# Patient Record
Sex: MALE | HISPANIC OR LATINO | Employment: FULL TIME | URBAN - METROPOLITAN AREA
[De-identification: names, ages, dates, MRNs, and addresses within clinical notes are randomized per-mention and may not be internally consistent; named-entity substitution may affect disease eponyms.]

---

## 2020-07-31 PROBLEM — U07.1 COVID-19 VIRUS INFECTION: Status: ACTIVE | Noted: 2020-07-31

## 2020-08-05 PROBLEM — K35.30 ACUTE APPENDICITIS WITH LOCALIZED PERITONITIS, WITHOUT PERFORATION OR GANGRENE: Status: ACTIVE | Noted: 2020-08-05

## 2020-08-17 ENCOUNTER — HOSPITAL ENCOUNTER (OUTPATIENT)
Facility: MEDICAL CENTER | Age: 75
DRG: 682 | End: 2020-08-17

## 2020-08-17 RX ORDER — ACETAMINOPHEN 325 MG/1
650 TABLET ORAL EVERY 6 HOURS PRN
Status: CANCELLED | OUTPATIENT
Start: 2020-08-17

## 2020-08-18 ENCOUNTER — APPOINTMENT (OUTPATIENT)
Dept: RADIOLOGY | Facility: MEDICAL CENTER | Age: 75
DRG: 682 | End: 2020-08-18
Attending: HOSPITALIST
Payer: OTHER GOVERNMENT

## 2020-08-18 ENCOUNTER — HOSPITAL ENCOUNTER (INPATIENT)
Facility: MEDICAL CENTER | Age: 75
LOS: 6 days | DRG: 682 | End: 2020-08-24
Attending: HOSPITALIST | Admitting: HOSPITALIST
Payer: OTHER GOVERNMENT

## 2020-08-18 PROBLEM — E87.5 HYPERKALEMIA: Status: ACTIVE | Noted: 2020-08-18

## 2020-08-18 PROBLEM — N17.9 ARF (ACUTE RENAL FAILURE) (HCC): Status: ACTIVE | Noted: 2020-08-18

## 2020-08-18 PROBLEM — N28.89 RENAL MASS: Status: ACTIVE | Noted: 2020-08-18

## 2020-08-18 LAB
ANION GAP SERPL CALC-SCNC: 21 MMOL/L (ref 7–16)
ANION GAP SERPL CALC-SCNC: 22 MMOL/L (ref 7–16)
ANION GAP SERPL CALC-SCNC: 23 MMOL/L (ref 7–16)
APPEARANCE UR: CLEAR
BILIRUB UR QL STRIP.AUTO: NEGATIVE
BUN SERPL-MCNC: 104 MG/DL (ref 8–22)
BUN SERPL-MCNC: 105 MG/DL (ref 8–22)
BUN SERPL-MCNC: 109 MG/DL (ref 8–22)
CALCIUM SERPL-MCNC: 8.1 MG/DL (ref 8.5–10.5)
CALCIUM SERPL-MCNC: 8.4 MG/DL (ref 8.5–10.5)
CALCIUM SERPL-MCNC: 8.8 MG/DL (ref 8.5–10.5)
CHLORIDE SERPL-SCNC: 102 MMOL/L (ref 96–112)
CHLORIDE SERPL-SCNC: 106 MMOL/L (ref 96–112)
CHLORIDE SERPL-SCNC: 106 MMOL/L (ref 96–112)
CO2 SERPL-SCNC: 12 MMOL/L (ref 20–33)
CO2 SERPL-SCNC: 14 MMOL/L (ref 20–33)
CO2 SERPL-SCNC: 15 MMOL/L (ref 20–33)
COLOR UR: YELLOW
COVID ORDER STATUS COVID19: NORMAL
CREAT SERPL-MCNC: 11.22 MG/DL (ref 0.5–1.4)
CREAT SERPL-MCNC: 11.32 MG/DL (ref 0.5–1.4)
CREAT SERPL-MCNC: 11.58 MG/DL (ref 0.5–1.4)
CREAT UR-MCNC: 77.15 MG/DL
EKG IMPRESSION: NORMAL
GLUCOSE BLD-MCNC: 89 MG/DL (ref 65–99)
GLUCOSE BLD-MCNC: 93 MG/DL (ref 65–99)
GLUCOSE SERPL-MCNC: 103 MG/DL (ref 65–99)
GLUCOSE SERPL-MCNC: 94 MG/DL (ref 65–99)
GLUCOSE SERPL-MCNC: 97 MG/DL (ref 65–99)
GLUCOSE UR STRIP.AUTO-MCNC: NEGATIVE MG/DL
KETONES UR STRIP.AUTO-MCNC: NEGATIVE MG/DL
LEUKOCYTE ESTERASE UR QL STRIP.AUTO: NEGATIVE
MICRO URNS: NORMAL
NITRITE UR QL STRIP.AUTO: NEGATIVE
PH UR STRIP.AUTO: 6 [PH] (ref 5–8)
POTASSIUM SERPL-SCNC: 5.3 MMOL/L (ref 3.6–5.5)
POTASSIUM SERPL-SCNC: 5.8 MMOL/L (ref 3.6–5.5)
POTASSIUM SERPL-SCNC: 6.2 MMOL/L (ref 3.6–5.5)
PROT UR QL STRIP: NEGATIVE MG/DL
RBC UR QL AUTO: NEGATIVE
SODIUM SERPL-SCNC: 139 MMOL/L (ref 135–145)
SODIUM SERPL-SCNC: 140 MMOL/L (ref 135–145)
SODIUM SERPL-SCNC: 142 MMOL/L (ref 135–145)
SODIUM UR-SCNC: 93 MMOL/L
SP GR UR STRIP.AUTO: 1.01
UROBILINOGEN UR STRIP.AUTO-MCNC: 0.2 MG/DL

## 2020-08-18 PROCEDURE — C9803 HOPD COVID-19 SPEC COLLECT: HCPCS | Performed by: HOSPITALIST

## 2020-08-18 PROCEDURE — 700102 HCHG RX REV CODE 250 W/ 637 OVERRIDE(OP): Performed by: INTERNAL MEDICINE

## 2020-08-18 PROCEDURE — 700111 HCHG RX REV CODE 636 W/ 250 OVERRIDE (IP): Performed by: HOSPITALIST

## 2020-08-18 PROCEDURE — 700105 HCHG RX REV CODE 258: Performed by: INTERNAL MEDICINE

## 2020-08-18 PROCEDURE — U0003 INFECTIOUS AGENT DETECTION BY NUCLEIC ACID (DNA OR RNA); SEVERE ACUTE RESPIRATORY SYNDROME CORONAVIRUS 2 (SARS-COV-2) (CORONAVIRUS DISEASE [COVID-19]), AMPLIFIED PROBE TECHNIQUE, MAKING USE OF HIGH THROUGHPUT TECHNOLOGIES AS DESCRIBED BY CMS-2020-01-R: HCPCS

## 2020-08-18 PROCEDURE — 700111 HCHG RX REV CODE 636 W/ 250 OVERRIDE (IP): Performed by: INTERNAL MEDICINE

## 2020-08-18 PROCEDURE — 80048 BASIC METABOLIC PNL TOTAL CA: CPT

## 2020-08-18 PROCEDURE — 82962 GLUCOSE BLOOD TEST: CPT

## 2020-08-18 PROCEDURE — 93005 ELECTROCARDIOGRAM TRACING: CPT | Performed by: HOSPITALIST

## 2020-08-18 PROCEDURE — 700101 HCHG RX REV CODE 250: Performed by: INTERNAL MEDICINE

## 2020-08-18 PROCEDURE — 93010 ELECTROCARDIOGRAM REPORT: CPT | Performed by: INTERNAL MEDICINE

## 2020-08-18 PROCEDURE — 770020 HCHG ROOM/CARE - TELE (206)

## 2020-08-18 PROCEDURE — 94640 AIRWAY INHALATION TREATMENT: CPT

## 2020-08-18 PROCEDURE — 51798 US URINE CAPACITY MEASURE: CPT

## 2020-08-18 PROCEDURE — 99223 1ST HOSP IP/OBS HIGH 75: CPT | Performed by: INTERNAL MEDICINE

## 2020-08-18 PROCEDURE — 76775 US EXAM ABDO BACK WALL LIM: CPT

## 2020-08-18 PROCEDURE — 84300 ASSAY OF URINE SODIUM: CPT

## 2020-08-18 PROCEDURE — A9270 NON-COVERED ITEM OR SERVICE: HCPCS | Performed by: INTERNAL MEDICINE

## 2020-08-18 PROCEDURE — 81003 URINALYSIS AUTO W/O SCOPE: CPT

## 2020-08-18 PROCEDURE — 82570 ASSAY OF URINE CREATININE: CPT

## 2020-08-18 PROCEDURE — 36415 COLL VENOUS BLD VENIPUNCTURE: CPT

## 2020-08-18 RX ORDER — HEPARIN SODIUM 5000 [USP'U]/ML
5000 INJECTION, SOLUTION INTRAVENOUS; SUBCUTANEOUS EVERY 8 HOURS
Status: DISCONTINUED | OUTPATIENT
Start: 2020-08-18 | End: 2020-08-24 | Stop reason: HOSPADM

## 2020-08-18 RX ORDER — LACTULOSE 10 G/15ML
300 SOLUTION ORAL ONCE
Status: DISPENSED | OUTPATIENT
Start: 2020-08-18 | End: 2020-08-19

## 2020-08-18 RX ORDER — BISACODYL 10 MG
10 SUPPOSITORY, RECTAL RECTAL
Status: DISCONTINUED | OUTPATIENT
Start: 2020-08-18 | End: 2020-08-24 | Stop reason: HOSPADM

## 2020-08-18 RX ORDER — POLYETHYLENE GLYCOL 3350 17 G/17G
1 POWDER, FOR SOLUTION ORAL
Status: DISCONTINUED | OUTPATIENT
Start: 2020-08-18 | End: 2020-08-24 | Stop reason: HOSPADM

## 2020-08-18 RX ORDER — LABETALOL HYDROCHLORIDE 5 MG/ML
10 INJECTION, SOLUTION INTRAVENOUS EVERY 4 HOURS PRN
Status: DISCONTINUED | OUTPATIENT
Start: 2020-08-18 | End: 2020-08-24 | Stop reason: HOSPADM

## 2020-08-18 RX ORDER — CALCIUM GLUCONATE 94 MG/ML
1 INJECTION, SOLUTION INTRAVENOUS ONCE
Status: DISCONTINUED | OUTPATIENT
Start: 2020-08-18 | End: 2020-08-18

## 2020-08-18 RX ORDER — SODIUM POLYSTYRENE SULFONATE 15 G/60ML
15 SUSPENSION ORAL; RECTAL ONCE
Status: COMPLETED | OUTPATIENT
Start: 2020-08-18 | End: 2020-08-18

## 2020-08-18 RX ORDER — IPRATROPIUM BROMIDE AND ALBUTEROL SULFATE 2.5; .5 MG/3ML; MG/3ML
3 SOLUTION RESPIRATORY (INHALATION)
Status: DISCONTINUED | OUTPATIENT
Start: 2020-08-18 | End: 2020-08-24 | Stop reason: HOSPADM

## 2020-08-18 RX ORDER — AMOXICILLIN 250 MG
2 CAPSULE ORAL 2 TIMES DAILY
Status: DISCONTINUED | OUTPATIENT
Start: 2020-08-18 | End: 2020-08-24 | Stop reason: HOSPADM

## 2020-08-18 RX ORDER — SODIUM CHLORIDE 9 MG/ML
INJECTION, SOLUTION INTRAVENOUS CONTINUOUS
Status: DISCONTINUED | OUTPATIENT
Start: 2020-08-18 | End: 2020-08-18

## 2020-08-18 RX ORDER — SODIUM BICARBONATE IN D5W 150/1000ML
PLASTIC BAG, INJECTION (ML) INTRAVENOUS CONTINUOUS
Status: DISCONTINUED | OUTPATIENT
Start: 2020-08-18 | End: 2020-08-19

## 2020-08-18 RX ORDER — DEXTROSE MONOHYDRATE 25 G/50ML
50 INJECTION, SOLUTION INTRAVENOUS ONCE
Status: COMPLETED | OUTPATIENT
Start: 2020-08-18 | End: 2020-08-18

## 2020-08-18 RX ORDER — ACETAMINOPHEN 325 MG/1
650 TABLET ORAL EVERY 6 HOURS PRN
Status: DISCONTINUED | OUTPATIENT
Start: 2020-08-18 | End: 2020-08-24 | Stop reason: HOSPADM

## 2020-08-18 RX ADMIN — HEPARIN SODIUM 5000 UNITS: 5000 INJECTION, SOLUTION INTRAVENOUS; SUBCUTANEOUS at 21:38

## 2020-08-18 RX ADMIN — INSULIN HUMAN 10 UNITS: 100 INJECTION, SOLUTION PARENTERAL at 03:50

## 2020-08-18 RX ADMIN — ALBUTEROL SULFATE 5 MG: 2.5 SOLUTION RESPIRATORY (INHALATION) at 04:27

## 2020-08-18 RX ADMIN — DEXTROSE MONOHYDRATE 50 ML: 25 INJECTION, SOLUTION INTRAVENOUS at 03:53

## 2020-08-18 RX ADMIN — SODIUM CHLORIDE: 9 INJECTION, SOLUTION INTRAVENOUS at 03:57

## 2020-08-18 RX ADMIN — CALCIUM GLUCONATE 1 G: 98 INJECTION, SOLUTION INTRAVENOUS at 12:04

## 2020-08-18 RX ADMIN — SODIUM BICARBONATE 50 MEQ: 84 INJECTION, SOLUTION INTRAVENOUS at 06:05

## 2020-08-18 RX ADMIN — PATIROMER 8.4 G: 8.4 POWDER, FOR SUSPENSION ORAL at 11:56

## 2020-08-18 RX ADMIN — ASPIRIN 81 MG: 81 TABLET, COATED ORAL at 04:03

## 2020-08-18 RX ADMIN — HEPARIN SODIUM 5000 UNITS: 5000 INJECTION, SOLUTION INTRAVENOUS; SUBCUTANEOUS at 04:03

## 2020-08-18 RX ADMIN — SODIUM BICARBONATE 125 ML/HR: 84 INJECTION, SOLUTION INTRAVENOUS at 10:20

## 2020-08-18 RX ADMIN — DOCUSATE SODIUM 50 MG AND SENNOSIDES 8.6 MG 2 TABLET: 8.6; 5 TABLET, FILM COATED ORAL at 04:03

## 2020-08-18 RX ADMIN — SODIUM BICARBONATE: 84 INJECTION, SOLUTION INTRAVENOUS at 20:19

## 2020-08-18 RX ADMIN — HEPARIN SODIUM 5000 UNITS: 5000 INJECTION, SOLUTION INTRAVENOUS; SUBCUTANEOUS at 16:20

## 2020-08-18 RX ADMIN — SODIUM POLYSTYRENE SULFONATE 15 G: 15 SUSPENSION ORAL; RECTAL at 06:18

## 2020-08-18 ASSESSMENT — COGNITIVE AND FUNCTIONAL STATUS - GENERAL
SUGGESTED CMS G CODE MODIFIER MOBILITY: CH
DAILY ACTIVITIY SCORE: 24
SUGGESTED CMS G CODE MODIFIER DAILY ACTIVITY: CH
MOBILITY SCORE: 24

## 2020-08-18 ASSESSMENT — PATIENT HEALTH QUESTIONNAIRE - PHQ9
2. FEELING DOWN, DEPRESSED, IRRITABLE, OR HOPELESS: NOT AT ALL
SUM OF ALL RESPONSES TO PHQ9 QUESTIONS 1 AND 2: 0
1. LITTLE INTEREST OR PLEASURE IN DOING THINGS: NOT AT ALL

## 2020-08-18 ASSESSMENT — ENCOUNTER SYMPTOMS
CONSTITUTIONAL NEGATIVE: 1
COUGH: 0
MYALGIAS: 0
NAUSEA: 0
BRUISES/BLEEDS EASILY: 0
SORE THROAT: 0
PALPITATIONS: 0
DIZZINESS: 0
NECK PAIN: 0
STRIDOR: 0
INSOMNIA: 0
FEVER: 0
HEMOPTYSIS: 0
DEPRESSION: 0
WEIGHT LOSS: 0
DOUBLE VISION: 0
BLURRED VISION: 0
VOMITING: 0
HEADACHES: 0

## 2020-08-18 ASSESSMENT — COPD QUESTIONNAIRES
DO YOU EVER COUGH UP ANY MUCUS OR PHLEGM?: NO/ONLY WITH OCCASIONAL COLDS OR INFECTIONS
COPD SCREENING SCORE: 2
DURING THE PAST 4 WEEKS HOW MUCH DID YOU FEEL SHORT OF BREATH: NONE/LITTLE OF THE TIME
IN THE PAST 12 MONTHS DO YOU DO LESS THAN YOU USED TO BECAUSE OF YOUR BREATHING PROBLEMS: DISAGREE/UNSURE
HAVE YOU SMOKED AT LEAST 100 CIGARETTES IN YOUR ENTIRE LIFE: NO/DON'T KNOW

## 2020-08-18 ASSESSMENT — LIFESTYLE VARIABLES
ALCOHOL_USE: NO
CONSUMPTION TOTAL: NEGATIVE
TOTAL SCORE: 0
EVER HAD A DRINK FIRST THING IN THE MORNING TO STEADY YOUR NERVES TO GET RID OF A HANGOVER: NO
EVER_SMOKED: YES
DOES PATIENT WANT TO STOP DRINKING: NO
TOTAL SCORE: 0
ON A TYPICAL DAY WHEN YOU DRINK ALCOHOL HOW MANY DRINKS DO YOU HAVE: 0
TOTAL SCORE: 0
HAVE YOU EVER FELT YOU SHOULD CUT DOWN ON YOUR DRINKING: NO
HOW MANY TIMES IN THE PAST YEAR HAVE YOU HAD 5 OR MORE DRINKS IN A DAY: 0
AVERAGE NUMBER OF DAYS PER WEEK YOU HAVE A DRINK CONTAINING ALCOHOL: 0
HAVE PEOPLE ANNOYED YOU BY CRITICIZING YOUR DRINKING: NO
EVER FELT BAD OR GUILTY ABOUT YOUR DRINKING: NO

## 2020-08-18 ASSESSMENT — FIBROSIS 4 INDEX: FIB4 SCORE: 1.17

## 2020-08-18 NOTE — PROGRESS NOTES
2 RN skin check completed with ERENDIRA Pagan  Devices in place N/A.  Confirmed pressure ulcers found on N/A.  New potential pressure ulcers noted on bilateral ears. Wound consult placed 8/18/20.  The following interventions in place keeping skin clean and dry, patient does not require oxygen at this time.

## 2020-08-18 NOTE — PROGRESS NOTES
Patient arrived on unit approximately 0100 in care of EMS.  Admitting and hospitalist paged.  Admission profile completed with .  Patient is Azerbaijani speaking ONLY.  A&O x4, up with standby assist and steady.

## 2020-08-18 NOTE — ASSESSMENT & PLAN NOTE
Renal mass noted on images from CT at [facility] July 29; usg renal showed renal mass   -I called urology Dr. Heredia on 8/20/2020 with stated that he will follow the patient as an outpatient.  He also said that he will make an appointment for him

## 2020-08-18 NOTE — H&P
Hospital Medicine History & Physical Note    Date of Service  8/18/2020    Primary Care Physician  Pcp Pt States None    Consultants  Interventional radiology pending    Code Status  Full Code    Chief Complaint  No chief complaint on file.      History of Presenting Illness  75 y.o. male who presented 8/18/2020 with past medical history of hypertension treated with lisinopril and was well until July 29, 2020.  He was brought to [facility] for evaluation of abdominal pain and cough he was found to have COVID-19, creatinine of 1.8, suspected acute appendicitis and a renal mass.    Patient had been greatly improved in fact only complains of exception of extreme thirst but follow-up labs revealed a creatinine of 12, BUN of 112 and a creatinine of 6.0 without acute EKG changes.  Patient is conversational and seemingly well he does have mild left-sided flank pain.  He denies recent changes in his urine output or bowel habits.      Review of Systems  Review of Systems   Constitutional: Negative.  Negative for fever, malaise/fatigue and weight loss.   HENT: Negative for sore throat and tinnitus.    Eyes: Negative for blurred vision and double vision.   Respiratory: Negative for cough, hemoptysis and stridor.    Cardiovascular: Negative for chest pain and palpitations.   Gastrointestinal: Negative for nausea and vomiting.   Genitourinary: Negative for dysuria and urgency.   Musculoskeletal: Negative for myalgias and neck pain.   Skin: Negative for itching and rash.   Neurological: Negative for dizziness and headaches.   Endo/Heme/Allergies: Does not bruise/bleed easily.   Psychiatric/Behavioral: Negative for depression. The patient does not have insomnia.        Past Medical History   has a past medical history of Hypertension.    Surgical History   has no past surgical history on file.     Family History  family history includes Diabetes in his sister.     Social History   reports that he quit smoking about 10 years  ago. His smoking use included cigarettes. He has never used smokeless tobacco. He reports that he does not drink alcohol or use drugs.    Allergies  No Known Allergies    Medications  Prior to Admission Medications   Prescriptions Last Dose Informant Patient Reported? Taking?   aspirin (SB LOW DOSE ASA EC) 81 MG EC tablet 8/17/2020 at Unknown time Patient Yes No   Sig: Take 81 mg by mouth every day.   lisinopril (PRINIVIL, ZESTRIL) 40 MG tablet 8/17/2020 at Unknown time  No No   Sig: take 1/2 tablet by mouth once daily for blood pressure      Facility-Administered Medications: None       Physical Exam  Temp:  [36.6 °C (97.9 °F)] 36.6 °C (97.9 °F)  Pulse:  [74] 74  Resp:  [17] 17  BP: (153)/(61) 153/61  SpO2:  [93 %] 93 %    Physical Exam  Vitals signs and nursing note reviewed. Exam conducted with a chaperone present.   Constitutional:       Appearance: Normal appearance. He is normal weight.   HENT:      Head: Normocephalic and atraumatic.      Mouth/Throat:      Mouth: Mucous membranes are dry.   Eyes:      Extraocular Movements: Extraocular movements intact.      Conjunctiva/sclera: Conjunctivae normal.      Pupils: Pupils are equal, round, and reactive to light.   Neck:      Musculoskeletal: Normal range of motion and neck supple.   Cardiovascular:      Rate and Rhythm: Normal rate and regular rhythm.      Pulses: Normal pulses.      Heart sounds: Normal heart sounds.   Pulmonary:      Effort: Pulmonary effort is normal.      Breath sounds: Normal breath sounds.   Abdominal:      General: Abdomen is flat. Bowel sounds are normal.      Palpations: Abdomen is soft.   Musculoskeletal:         General: Tenderness present. No swelling, deformity or signs of injury.      Comments: Left leg tenderness   Skin:     General: Skin is warm and dry.      Capillary Refill: Capillary refill takes less than 2 seconds.   Neurological:      General: No focal deficit present.      Mental Status: He is alert and oriented to person,  place, and time. Mental status is at baseline.   Psychiatric:         Mood and Affect: Mood normal.         Behavior: Behavior normal.         Thought Content: Thought content normal.         Judgment: Judgment normal.         Laboratory:  Recent Labs     08/17/20 2005   WBC 11.3*   RBC 4.65*   HEMOGLOBIN 13.7*   HEMATOCRIT 42.6   MCV 91.6   MCH 29.5   MCHC 32.2*   RDW 13.5   PLATELETCT 449*   MPV 10.1     Recent Labs     08/17/20 2050   SODIUM 141   POTASSIUM 6.0*   CHLORIDE 110*   CO2 10*   GLUCOSE 108*   *   CREATININE 12.7*   CALCIUM 8.6*     Recent Labs     08/17/20 2050   ALTSGPT 17   ASTSGOT 29   ALKPHOSPHAT 178*   TBILIRUBIN 0.7   LIPASE 307*   GLUCOSE 108*         No results for input(s): NTPROBNP in the last 72 hours.      No results for input(s): TROPONINT in the last 72 hours.    Imaging:  No orders to display         Assessment/Plan:  I anticipate this patient will require at least two midnights for appropriate medical management, necessitating inpatient admission.    Renal mass  Assessment & Plan  Renal mass noted on images from CT at [facility] July 29.  Images requested, follow-up interventional radiology consult    Hyperkalemia  Assessment & Plan  Secondary to lisinopril c acute renal failure, hyperkalemia care set initiated.  Kayexalate p.o. lactulose VA, follow-up a.m. chemistry, consider repeat EKG as no T wave changes on initial tracing.      ARF (acute renal failure) (HCC)  Assessment & Plan  Acute on CKD 3, complicated by renal mass, Lisinopril, and severe prerenal azotemia.  IV fluid challenge, avoid nephrotoxic meds and doses, follow-up renal ultrasound and a.m. chemistry.  Obtain images from [facility].    COVID-19 virus infection- (present on admission)  Assessment & Plan  Diagnosed July 29.  Currently asymptomatic.

## 2020-08-18 NOTE — ASSESSMENT & PLAN NOTE
Acute on CKD 3, complicated by renal mass   --Likely secondary to ATN, nephrology following, monitor I/Os, avoid nephrotoxin, renal adjustment of medication  Patient to have low bicarb at 16 along with loose bowel movement, he was started on half NS with 75 mEq of bicarb   - creatinine improving at 7s , will monitor

## 2020-08-18 NOTE — RESPIRATORY CARE
5mg nebulizer treatment given at 0427.   Placed sign outside door for PAPR/CAPR be worn inside room until 0530.

## 2020-08-18 NOTE — PROGRESS NOTES
This 75-year-old male with a past medical history significant for hypertension on lisinopril was transferred from [facility] after he was noted to be in acute renal failure with a creatinine of 12.7.  Patient was recently diagnosed with COVID on July 29; at that time he is noted to have BUN of 43 and creatinine of 1.8.    Upon presentation he is found to have BUN of 111, creatinine of 12.7, potassium 6.0.  Urine sodium, urine creatinine ordered, retroperitoneal ultrasound obtained; no hydronephrosis noted, atrophic right kidney possible due to chronic renal disease, 1.6 cm right renal cortical mass.    Patient was started on bicarb drip, nephrology was consulted.  Will monitor strict I/O.  Will call urology tomorrow as patient was noted to have right renal mass.    Repeat BMP showed K of 5.3  And HCO3 of 15.  Avoid nephrotoxins, IV fluid, monitor potassium tomorrow a.m.

## 2020-08-18 NOTE — ASSESSMENT & PLAN NOTE
Diagnosed July 29.  Currently asymptomatic.   --- Infection complete, patient has recovered from infection

## 2020-08-18 NOTE — PROGRESS NOTES
75-year-old male transferring from [facility], where he was found to have an acute kidney injury with creatinine 12.7 and .  Was recently evaluated and diagnosed with coronavirus on 29 July, at which time he had a normal BMP with creatinine 1.8 and BUN of 43.  Currently has a potassium of 6 with no EKG changes.

## 2020-08-18 NOTE — CONSULTS
[facility] Nephrology Consult Note     Date of Service  8/18     Author: Kieran Perez Jr., MD    Reason for consult:  ARMANDO    HPI:  74 yo male with h/o HTN, who presented to [facility] with ARMANDO. He was found to be COVID positive. He reportedly was on lisinopril, but patient states he has not taken that for years. He denies NSAID use. He does report some diarrhea.     He denies change in urination. He reports no pain. He was found to have creatinine of 12, BUN of 112 and K of 6.0 so was transferred down to [facility].     Since admission, he reports making some urine. He was started on sodium bicarbonate gtt. He asks why he is getting his blood checked so often.     I d/w him possible need for dialysis. He seems sceptical that he needs it, but I d/w him if K keeps rising, his heart can stop.     Renal US shows atrophic right kidney as well as right renal mass. No obstruction of left kidney.      Past Medical History  Past Medical History:   Diagnosis Date   • Hypertension        Past Surgical History  History reviewed. No pertinent surgical history.    Social History:  Social History     Socioeconomic History   • Marital status:      Spouse name: Not on file   • Number of children: Not on file   • Years of education: Not on file   • Highest education level: Not on file   Occupational History   • Not on file   Social Needs   • Financial resource strain: Not on file   • Food insecurity     Worry: Not on file     Inability: Not on file   • Transportation needs     Medical: Not on file     Non-medical: Not on file   Tobacco Use   • Smoking status: Former Smoker     Types: Cigarettes     Quit date: 7/29/2010     Years since quitting: 10.0   • Smokeless tobacco: Never Used   • Tobacco comment: 1 siobhan daily   Substance and Sexual Activity   • Alcohol use: No   • Drug use: No   • Sexual activity: Not on file   Lifestyle   • Physical activity     Days per week: Not on file     Minutes per session:  Not on file   • Stress: Not on file   Relationships   • Social connections     Talks on phone: Not on file     Gets together: Not on file     Attends Hinduism service: Not on file     Active member of club or organization: Not on file     Attends meetings of clubs or organizations: Not on file     Relationship status: Not on file   • Intimate partner violence     Fear of current or ex partner: Not on file     Emotionally abused: Not on file     Physically abused: Not on file     Forced sexual activity: Not on file   Other Topics Concern   • Not on file   Social History Narrative   • Not on file       Review of Systems  GEN: no fevers/chills/weight loss  HEENT: No vision changes  RESP: No shortness of breath  CV: No edema, no chest pain  ABD: no N/V, no edema  EXT: no edema  Musculoskeletal: no joint pain  NEURO: no headaches, no weakness  PSYCH: no confusion, no depression      Medications  Administrations This Visit     albuterol (PROVENTIL) 2.5 mg/0.5 mL nebulizer solution 5 mg     Admin Date  08/18/2020 Action  Given Dose  5 mg Route  Nebulization Site            aspirin EC (ECOTRIN) tablet 81 mg     Admin Date  08/18/2020 Action  Given Dose  81 mg Route  Oral Site            dextrose 50% (D50W) injection 50 mL     Admin Date  08/18/2020 Action  New Bag Dose  50 mL Route  Intravenous Site            heparin injection 5,000 Units     Admin Date  08/18/2020 Action  Given Dose  5000 Units Route  Subcutaneous Site  Left Lower Quad Abdomen          insulin regular (HumuLIN R,NovoLIN R) injection     Admin Date  08/18/2020 Action  Given Dose  10 Units Route  Intravenous Site            NS infusion     Admin Date  08/18/2020 Action  New Bag Dose   Rate  200 mL/hr Route  Intravenous Site            senna-docusate (PERICOLACE or SENOKOT S) 8.6-50 MG per tablet 2 Tab     Admin Date  08/18/2020 Action  Given Dose  2 Tab Route  Oral Site            sodium bicarbonate 150 mEq in D5W infusion (premix)     Admin  Date  08/18/2020 Action  New Bag Dose  125 mL/hr Rate  125 mL/hr Route  Intravenous Site            sodium bicarbonate 8.4 % injection 50 mEq     Admin Date  08/18/2020 Action  Given Dose  50 mEq Route  Intravenous Site            sodium polystyrene (KAYEXALATE) 15 GM/60ML suspension 15 g     Admin Date  08/18/2020 Action  Given Dose  15 g Route  Oral Site                  Physical Exam  Vitals:    08/18/20 0801   BP: 134/64   Pulse: 79   Resp: 19   Temp: 36.1 °C (97 °F)   SpO2: 94%          Physical Exam  Constitutional:       Appearance: Normal appearance.   HENT:      Head: Normocephalic and atraumatic.      Nose: Nose normal.   Eyes:      Extraocular Movements: Extraocular movements intact.      Conjunctiva/sclera: Conjunctivae normal.      Pupils: Pupils are equal, round, and reactive to light.   Neck:      Musculoskeletal: Normal range of motion and neck supple.   Cardiovascular:      Rate and Rhythm: Normal rate and regular rhythm.      Pulses: Normal pulses.      Heart sounds: No murmur. No friction rub. No gallop.    Pulmonary:      Effort: Pulmonary effort is normal.      Breath sounds: Normal breath sounds.   Abdominal:      General: Abdomen is flat. Bowel sounds are normal.      Palpations: Abdomen is soft.   Musculoskeletal:         General: No edema  Skin:     General: Skin is warm and dry.   Neurological:      General: No focal deficit present.      Mental Status: He is alert and oriented to person, place, and time.        Fluids       Intake/Output Summary (Last 24 hours) at 8/18/2020 1109  Last data filed at 8/18/2020 1000  Gross per 24 hour   Intake 240 ml   Output --   Net 240 ml           Labs    Lab Results   Component Value Date/Time    SODIUM 140 08/18/2020 09:16 AM    POTASSIUM 6.2 (H) 08/18/2020 09:16 AM    CHLORIDE 106 08/18/2020 09:16 AM    CO2 12 (L) 08/18/2020 09:16 AM    GLUCOSE 94 08/18/2020 09:16 AM     (HH) 08/18/2020 09:16 AM    CREATININE 11.32 (HH) 08/18/2020 09:16 AM        Recent Labs     08/17/20 2005   WBC 11.3*   RBC 4.65*   HEMOGLOBIN 13.7*   HEMATOCRIT 42.6   MCV 91.6   MCH 29.5   RDW 13.5   PLATELETCT 449*   MPV 10.1       Radiology:  Renal US reviewed    Assessment  74 yo male with h/o HTN, CKD stage 3 with creatinine 1.8 on 7/29, who presents with creatinine of 12 in setting of COVID positive    1) ARMANDO - suspect mixture of pre-renal/ATN given diarrhea and COVID positive. Near needing dialysis. Will try to volume replete and see if his urination improves.   2) COVID Positive  3) Hyperkalemia - 2/2 to renal failure. If does not improve with medical management, will need dialysis  4) Possible CKD stage 3 - creaitnine 1.8 on 7/29, 2.5 weeks prior to admission  5) Diarrhea   6) Azotemia - without signs of uremia.   7) Renal mass - right atrophic kidney. Will need evaluation at later date.      PLAN:  1. Increase bicarbonate gtt to 150 ml/hr  2. Veltassa 8.4 grams PO x 1  3. Calcium gluconate 1 gram x 1  4. Continue with q6 hours BMP. If K does not improve, will need to start dialysis.   5. I have discussed above findings with hospitalist and RN personally.   6. Check urine tests and UA    Thank you for the consult, please call with any questions.    Kieran Perez Jr, MD  [facility] Nephrology

## 2020-08-18 NOTE — PROGRESS NOTES
Received ED to Direct Admit transfer request from [facility] ED, [place], [place].  Sending Physician: Tom  Specialist consulted: NA  Diagnosis: Acute Renal Failure; COVID19 positive (7/29) - no repeat testing; generalized weakness; decreased appetite; decreased oral intake.  BUN/Creat 111/12.7 (up from BUN/Creat 4.3/1.8); K 6.  Patient Accepted by: Konstantin

## 2020-08-19 PROBLEM — D64.9 NORMOCYTIC ANEMIA: Status: ACTIVE | Noted: 2020-08-19

## 2020-08-19 LAB
ALBUMIN SERPL BCP-MCNC: 2.6 G/DL (ref 3.2–4.9)
BASOPHILS # BLD AUTO: 0.5 % (ref 0–1.8)
BASOPHILS # BLD: 0.03 K/UL (ref 0–0.12)
BUN SERPL-MCNC: 97 MG/DL (ref 8–22)
CALCIUM SERPL-MCNC: 7.9 MG/DL (ref 8.5–10.5)
CHLORIDE SERPL-SCNC: 98 MMOL/L (ref 96–112)
CO2 SERPL-SCNC: 23 MMOL/L (ref 20–33)
CREAT SERPL-MCNC: 10.72 MG/DL (ref 0.5–1.4)
EOSINOPHIL # BLD AUTO: 0.11 K/UL (ref 0–0.51)
EOSINOPHIL NFR BLD: 1.9 % (ref 0–6.9)
ERYTHROCYTE [DISTWIDTH] IN BLOOD BY AUTOMATED COUNT: 43.8 FL (ref 35.9–50)
GLUCOSE SERPL-MCNC: 152 MG/DL (ref 65–99)
HCT VFR BLD AUTO: 31.4 % (ref 42–52)
HGB BLD-MCNC: 10.1 G/DL (ref 14–18)
IMM GRANULOCYTES # BLD AUTO: 0.03 K/UL (ref 0–0.11)
IMM GRANULOCYTES NFR BLD AUTO: 0.5 % (ref 0–0.9)
LYMPHOCYTES # BLD AUTO: 0.67 K/UL (ref 1–4.8)
LYMPHOCYTES NFR BLD: 11.4 % (ref 22–41)
MCH RBC QN AUTO: 29.6 PG (ref 27–33)
MCHC RBC AUTO-ENTMCNC: 32.2 G/DL (ref 33.7–35.3)
MCV RBC AUTO: 92.1 FL (ref 81.4–97.8)
MONOCYTES # BLD AUTO: 0.42 K/UL (ref 0–0.85)
MONOCYTES NFR BLD AUTO: 7.1 % (ref 0–13.4)
NEUTROPHILS # BLD AUTO: 4.62 K/UL (ref 1.82–7.42)
NEUTROPHILS NFR BLD: 78.6 % (ref 44–72)
NRBC # BLD AUTO: 0 K/UL
NRBC BLD-RTO: 0 /100 WBC
PHOSPHATE SERPL-MCNC: 8.1 MG/DL (ref 2.5–4.5)
PLATELET # BLD AUTO: 340 K/UL (ref 164–446)
PMV BLD AUTO: 10.3 FL (ref 9–12.9)
POTASSIUM SERPL-SCNC: 4.2 MMOL/L (ref 3.6–5.5)
RBC # BLD AUTO: 3.41 M/UL (ref 4.7–6.1)
SARS-COV-2 RNA RESP QL NAA+PROBE: DETECTED
SODIUM SERPL-SCNC: 140 MMOL/L (ref 135–145)
SPECIMEN SOURCE: ABNORMAL
WBC # BLD AUTO: 5.9 K/UL (ref 4.8–10.8)

## 2020-08-19 PROCEDURE — 700111 HCHG RX REV CODE 636 W/ 250 OVERRIDE (IP): Performed by: INTERNAL MEDICINE

## 2020-08-19 PROCEDURE — 700102 HCHG RX REV CODE 250 W/ 637 OVERRIDE(OP): Performed by: INTERNAL MEDICINE

## 2020-08-19 PROCEDURE — 85025 COMPLETE CBC W/AUTO DIFF WBC: CPT

## 2020-08-19 PROCEDURE — 700101 HCHG RX REV CODE 250: Performed by: INTERNAL MEDICINE

## 2020-08-19 PROCEDURE — A9270 NON-COVERED ITEM OR SERVICE: HCPCS | Performed by: INTERNAL MEDICINE

## 2020-08-19 PROCEDURE — 99233 SBSQ HOSP IP/OBS HIGH 50: CPT | Performed by: HOSPITALIST

## 2020-08-19 PROCEDURE — 36415 COLL VENOUS BLD VENIPUNCTURE: CPT

## 2020-08-19 PROCEDURE — 80069 RENAL FUNCTION PANEL: CPT

## 2020-08-19 PROCEDURE — 770020 HCHG ROOM/CARE - TELE (206)

## 2020-08-19 RX ADMIN — HEPARIN SODIUM 5000 UNITS: 5000 INJECTION, SOLUTION INTRAVENOUS; SUBCUTANEOUS at 05:08

## 2020-08-19 RX ADMIN — ASPIRIN 81 MG: 81 TABLET, COATED ORAL at 05:08

## 2020-08-19 RX ADMIN — HEPARIN SODIUM 5000 UNITS: 5000 INJECTION, SOLUTION INTRAVENOUS; SUBCUTANEOUS at 21:29

## 2020-08-19 RX ADMIN — HEPARIN SODIUM 5000 UNITS: 5000 INJECTION, SOLUTION INTRAVENOUS; SUBCUTANEOUS at 13:45

## 2020-08-19 RX ADMIN — SODIUM BICARBONATE: 84 INJECTION, SOLUTION INTRAVENOUS at 04:30

## 2020-08-19 ASSESSMENT — ENCOUNTER SYMPTOMS
SORE THROAT: 0
VOMITING: 0
ABDOMINAL PAIN: 0
DEPRESSION: 0
HEMOPTYSIS: 0
BLURRED VISION: 0
WEIGHT LOSS: 0
COUGH: 0
PHOTOPHOBIA: 0
FEVER: 0
ORTHOPNEA: 0
PALPITATIONS: 0
HEARTBURN: 0
NAUSEA: 0
MYALGIAS: 1
DOUBLE VISION: 0
NECK PAIN: 0
SHORTNESS OF BREATH: 1
CHILLS: 0

## 2020-08-19 NOTE — WOUND TEAM
[facility]  Inpatient Services  Initial Wound and Skin Care Evaluation    Admission Date: 8/18/2020     Last order of IP CONSULT TO WOUND CARE was found on 8/18/2020 from Hospital Encounter on 8/17/2020       HPI, PMH, SH: Reviewed    Unit where seen by Wound Team: S175/[id number]     WOUND CONSULT/FOLLOW UP RELATED TO:  Using  line was able to communicate with pt about wounds    Self Report / Pain Level:  Dolor with palpation      OBJECTIVE:  On pressure redistribution mattress, ears ELIECER, Pt was able to move self    WOUND TYPE, LOCATION, CHARACTERISTICS (Pressure Injuries: location, stage, POA or date identified)  Wound 08/18/20 Pressure Injury Ear Posterior Left POA DTI (Active)   Site Assessment Purple;Red    Periwound Assessment Intact    Margins Defined edges    Drainage Amount None    Dressing Options Open to Air    NEXT Weekly Photo (Inpatient Only) 08/19/20    Pressure Injury Stage DTPI    Wound Length (cm) 1 cm 08/18/20 1445   Wound Width (cm) 0.3 cm 08/18/20 1445   Wound Surface Area (cm^2) 0.3 cm^2 08/18/20 1445   Tunneling (cm) 0 cm 08/18/20 1445   Undermining (cm) 0 cm 08/18/20 1445   Shape linear    Wound Odor None    Exposed Structures None    Number of days: 0       Wound 08/18/20 Pressure Injury Ear Right POA DTI (Active)   Site Assessment Purple;Red    Periwound Assessment Intact    Margins Defined edges    Drainage Amount None    Dressing Options Open to Air    Dressing Status Open to Air    NEXT Weekly Photo (Inpatient Only) 08/19/20    Pressure Injury Stage DTPI    Wound Length (cm) 0.8 cm 08/18/20 1445   Wound Width (cm) 0.3 cm 08/18/20 1445   Wound Surface Area (cm^2) 0.24 cm^2 08/18/20 1445   Tunneling (cm) 0 cm 08/18/20 1445   Undermining (cm) 0 cm 08/18/20 1445   Shape linear    Wound Odor None    Exposed Structures None    Number of days: 0          Vascular:    CYNDY:   No results found.      Lab Values:    Lab Results   Component Value Date/Time    WBC 11.3 (H) 08/17/2020  "08:05 PM    RBC 4.65 (L) 08/17/2020 08:05 PM    HEMOGLOBIN 13.7 (L) 08/17/2020 08:05 PM    HEMATOCRIT 42.6 08/17/2020 08:05 PM          Culture:NA  Culture Results show:  No results found for this or any previous visit (from the past 720 hour(s)).      INTERVENTIONS BY WOUND TEAM:  Assessed both ears, left wounds ELIECER.  Interdisciplinary consultation: Patient, Bedside RN     EVALUATION: pt has DTI to proximal bilateral ears. Using  line was able to discover pt uses a face mask @ work and he wears it from [id number]. Per pt \"I only get to take off on my way home.\"  He was able to answer questions. Explained that he has wounds on ears \"like bedsores caused by his mask\". Pt requested note from  about reason he is in hospital. This was reported to primary RN. Wounds may open or resolve r/t no mask in place.       Goals: Steady decrease in wound area and depth weekly.    NURSING PLAN OF CARE ORDERS (X):    Dressing changes: See Dressing Care orders:   Skin care: See Skin Care orders: x  Rectal tube care: See Rectal Tube Care orders:   Other orders:    RSKIN:   CURRENTLY IN PLACE (X), APPLIED THIS VISIT (A), ORDERED (O):  Q shift Apolinar:  x  Q shift pressure point assessments:    Pressure redistribution mattress  x          Low Airloss          Bariatric RON         Bariatric foam           Heel float boots     Heel Silicone dressing        Float Heels off Bed with Pillows               Barrier wipes         Barrier Cream         Barrier paste          Sacral silicone dressing         Silicone O2 tubing         Anchorfast         Cannula fixation Device (Tender )          Gray Foam Ear protectors           Trach with Optifoam split foam                 Waffle cushion        Waffle Overlay         Rectal tube or BMS    Purwick/Condom Cath          Antifungal tx      Interdry          Reposition q 2 hours  Remind pt      Up to chair        Ambulate      PT/OT        Dietician        Diabetes Education      PO " x    TF     TPN     NPO   # days   Other        WOUND TEAM PLAN OF CARE   Dressing changes by wound team:          Follow up 1-2 times weekly:               Follow up 3 times weekly:                NPWT change 3 times weekly:     Follow up as needed:  If wounds worsen     Other (explain):     Anticipated discharge plans:  LTACH:        SNF/Rehab:                  Home Care:           Outpatient Wound Center:            Self Care:            Other: No advanced wound care needs

## 2020-08-19 NOTE — PROGRESS NOTES
Telemetry Shift Summary     Rhythm SR/SB  HR Range 56 - 90  Ectopy none  Measurements 0.14 / 0.12 / 0.42           Normal Values  Rhythm SR  HR Range    Measurements 0.12-0.20 / 0.06-0.10  / 0.30-0.52

## 2020-08-19 NOTE — CARE PLAN
Problem: Communication  Goal: The ability to communicate needs accurately and effectively will improve  Outcome: PROGRESSING AS EXPECTED  Intervention: Use communication aids and/or /Language Line as appropriate  Flowsheets (Taken 8/18/2020 2026)  Patient's Primary Language: Ukrainian  Note: Pt educated on POC and states understanding of teaching.        Problem: Safety  Goal: Will remain free from injury  Outcome: PROGRESSING AS EXPECTED  Goal: Will remain free from falls  Outcome: PROGRESSING AS EXPECTED  Intervention: Assess risk factors for falls  Flowsheets  Taken 8/18/2020 2026  Fall Risk: Risk to Fall -  0 - 1 point  History of fall: 0  Mobility Status Assessment: 0-Ambulates & Transfers Independently. No Assistance Required  Risk for Injury-Any positive answers results in the pt being at high risk for fall related injury: Not Applicable  Taken 8/18/2020 2020  Pt Calls for Assistance: Yes  Note: Pt educated on fall risk precautions, pt states understanding of education and agrees to interventions.        Problem: Infection  Goal: Will remain free from infection  Outcome: PROGRESSING AS EXPECTED  Intervention: Assess signs and symptoms of infection  Note: Pt educated on s/s of infection and infection prevention. COVID-19 isolation precautions in place.

## 2020-08-19 NOTE — PROGRESS NOTES
Patient alert and oriented, vss, denies pain. Patient continues to make urine, will monitor I&O's. Per nephrology patient does not need dialysis at this time as it seems patient is improving. Will monitor with daily labs.

## 2020-08-19 NOTE — PROGRESS NOTES
Assumed care of pt from offgoing RN, pt calm and cooperative with care. Assessment as documented, medicated per MAR. No complaints of pain,  utilized as needed for communication and education, pt is primarily Sudanese speaking. COVID-19 swab completed and sent to lab this shift. Potassium level improved, sodium bicarb infusion discontinued. OOB with SBA, calls appropriately. Maintains saturations on RA throughout the night, checked hourly for comfort and safety, report given to oncoming RN.

## 2020-08-19 NOTE — ASSESSMENT & PLAN NOTE
Reviewed iron Panel  And vitamin b12    -- noted to have  Mild vitamin  D  deficiency , will replete as needed

## 2020-08-19 NOTE — PROGRESS NOTES
[facility] Nephrology Daily Progress Note     Date of Service  8/19     Author: Kieran Perez Jr., MD     Chief Complaint  76 yo male with h/o HTN, who presented to [facility] with ARMANDO. He was found to be COVID positive. He reportedly was on lisinopril, but patient states he has not taken that for years. He denies NSAID use. He does report some diarrhea. He denies change in urination. He reports no pain. He was found to have creatinine of 12, BUN of 112 and K of 6.0 so was transferred down to [facility].   Since admission, he reports making some urine. He was started on sodium bicarbonate gtt. He asks why he is getting his blood checked so often.   I d/w him possible need for dialysis. He seems sceptical that he needs it, but I d/w him if K keeps rising, his heart can stop.   Renal US shows atrophic right kidney as well as right renal mass. No obstruction of left kidney.      Daily Nephrology Summary:   8/19 - K better. Creatine falling slowly. He is making urine.  used. Patient upset about not being sent home. He also is yelling at times without face mask on. I told him eventually I had to leave given risk of COVID transmission and I had said everything I could say about his kidney function.      Review of Systems  GEN: no fevers/chills/weight loss  HEENT: No vision changes  RESP: No shortness of breath  CV: No edema, no chest pain  ABD: no N/V, no edema  EXT: no edema  Musculoskeletal: no joint pain  NEURO: no headaches, no weakness  PSYCH: no confusion, no depression      Physical Exam  Vitals:    08/19/20 0735   BP: 123/55   Pulse: 60   Resp: 19   Temp: 36.2 °C (97.2 °F)   SpO2: 93%          Physical Exam  Alert, agitated  Normocephalic  Non labored Breathing  RRR  O2 sat 89% on RA  No edema  Clear urine in jug           Fluids       Intake/Output Summary (Last 24 hours) at 8/19/2020 0851  Last data filed at 8/19/2020 0230  Gross per 24 hour   Intake 240 ml   Output 300 ml   Net -60 ml            Labs    Lab Results   Component Value Date/Time    SODIUM 140 08/19/2020 01:03 AM    POTASSIUM 4.2 08/19/2020 01:03 AM    CHLORIDE 98 08/19/2020 01:03 AM    CO2 23 08/19/2020 01:03 AM    GLUCOSE 152 (H) 08/19/2020 01:03 AM    BUN 97 (HH) 08/19/2020 01:03 AM    CREATININE 10.72 (HH) 08/19/2020 01:03 AM       Recent Labs     08/17/20 2005 08/19/20  0103   WBC 11.3* 5.9   RBC 4.65* 3.41*   HEMOGLOBIN 13.7* 10.1*   HEMATOCRIT 42.6 31.4*   MCV 91.6 92.1   MCH 29.5 29.6   RDW 13.5 43.8   PLATELETCT 449* 340   MPV 10.1 10.3   NEUTSPOLYS  --  78.60*   LYMPHOCYTES  --  11.40*   MONOCYTES  --  7.10   EOSINOPHILS  --  1.90   BASOPHILS  --  0.50       Assessment/Plan  74 yo male with h/o HTN, CKD stage 3 with creatinine 1.8 on 7/29, who presents with creatinine of 12 in setting of COVID positive     1) ARMANDO - suspect mixture of pre-renal/ATN given diarrhea and COVID positive. FeNa 7.9%, c/w ATN.   2) COVID Positive  3) Hyperkalemia - 2/2 to renal failure. Improved with bicarbonate gtt and [place].   4) Possible CKD stage 3 - creaitnine 1.8 on 7/29, 2.5 weeks prior to admission  5) Diarrhea   6) Azotemia - without signs of uremia.   7) Renal mass - right atrophic kidney. Will need evaluation at later date.      PLAN:  1. No need for dialysis today  2. Check labs tomorrow AM. Hopefully ATN will resolve quickly as patient is being belligerent about staying in hospital.   3. Discontinue bicarbonate gtt.     Kieran Perez Jr, MD  [facility] Nephrology

## 2020-08-19 NOTE — CARE PLAN
Problem: Communication  Goal: The ability to communicate needs accurately and effectively will improve  Outcome: PROGRESSING AS EXPECTED  Note: Patient [place] speaking only and able to communicate with . Will monitor.      Problem: Safety  Goal: Will remain free from injury  Outcome: PROGRESSING AS EXPECTED  Goal: Will remain free from falls  Outcome: PROGRESSING AS EXPECTED  Note: Patient remains safe and free from injury.      Problem: Infection  Goal: Will remain free from infection  Outcome: PROGRESSING AS EXPECTED  Note: Covid positive patient, on isolation.

## 2020-08-19 NOTE — PROGRESS NOTES
[facility] Medicine Daily Progress Note    Date of Service  8/19/2020    Chief Complaint  75 y.o. male admitted 8/18/2020 with No chief complaint on file.        Hospital Course    This 75-year-old male with a past medical history significant for hypertension on lisinopril was transferred from [facility] after he was noted to be in acute renal failure with a creatinine of 12.7.  Patient was recently diagnosed with COVID on July 29; at that time he is noted to have BUN of 43 and creatinine of 1.8.     Upon presentation he is found to have BUN of 111, creatinine of 12.7, potassium 6.0.  Urine sodium, urine creatinine ordered, retroperitoneal ultrasound obtained; no hydronephrosis noted, atrophic right kidney possible due to chronic renal disease, 1.6 cm right renal cortical mass.     Patient was started on bicarb drip, nephrology was consulted.  Will monitor strict I/O.  Will call urology tomorrow as patient was noted to have right renal mass.     Repeat BMP showed K of 5.3  And HCO3 of 15.  Avoid nephrotoxins, IV fluid, monitor potassium tomorrow a.m.      Interval Problem Update  No acute events overnight, laying in the bed, potassium at 4.2, creatinine 10.72, nephro following  --Continue IV fluid, monitor I/os, avoid nephrotoxic medication.  --- Discussed the plan of the care with the patient by using     Consultants/Specialty  Nephrology    Code Status  Full Code    Disposition  Home once medically cleared    Review of Systems  Review of Systems   Constitutional: Negative for chills, fever and weight loss.   HENT: Negative for sore throat.    Eyes: Negative for blurred vision, double vision and photophobia.   Respiratory: Positive for shortness of breath. Negative for cough and hemoptysis.    Cardiovascular: Negative for chest pain, palpitations and orthopnea.   Gastrointestinal: Negative for abdominal pain, heartburn, nausea and vomiting.   Genitourinary: Negative for dysuria.   Musculoskeletal:  Positive for myalgias. Negative for neck pain.   Psychiatric/Behavioral: Negative for depression and suicidal ideas.        Physical Exam  Temp:  [36.1 °C (96.9 °F)-36.7 °C (98 °F)] 36.2 °C (97.2 °F)  Pulse:  [60-68] 60  Resp:  [17-19] 19  BP: (123-150)/(55-65) 123/55  SpO2:  [91 %-94 %] 93 %    Physical Exam  Vitals signs and nursing note reviewed.   Constitutional:       Appearance: Normal appearance.   HENT:      Head: Normocephalic and atraumatic.   Eyes:      Extraocular Movements: Extraocular movements intact.   Neck:      Musculoskeletal: Neck supple.   Cardiovascular:      Rate and Rhythm: Normal rate and regular rhythm.      Pulses: Normal pulses.   Pulmonary:      Effort: Pulmonary effort is normal.      Breath sounds: Normal breath sounds.   Abdominal:      General: Abdomen is flat. Bowel sounds are normal. There is no distension.      Palpations: Abdomen is soft.   Musculoskeletal: Normal range of motion.         General: No swelling.   Skin:     General: Skin is warm.   Neurological:      General: No focal deficit present.      Mental Status: He is alert and oriented to person, place, and time.   Psychiatric:         Mood and Affect: Mood normal.         Fluids    Intake/Output Summary (Last 24 hours) at 8/19/2020 1029  Last data filed at 8/19/2020 0230  Gross per 24 hour   Intake 240 ml   Output 200 ml   Net 40 ml       Laboratory  Recent Labs     08/17/20 2005 08/19/20  0103   WBC 11.3* 5.9   RBC 4.65* 3.41*   HEMOGLOBIN 13.7* 10.1*   HEMATOCRIT 42.6 31.4*   MCV 91.6 92.1   MCH 29.5 29.6   MCHC 32.2* 32.2*   RDW 13.5 43.8   PLATELETCT 449* 340   MPV 10.1 10.3     Recent Labs     08/18/20  0916 08/18/20  1259 08/19/20  0103   SODIUM 140 142 140   POTASSIUM 6.2* 5.3 4.2   CHLORIDE 106 106 98   CO2 12* 15* 23   GLUCOSE 94 103* 152*   * 104* 97*   CREATININE 11.32* 11.58* 10.72*   CALCIUM 8.1* 8.4* 7.9*                   Imaging  US-RENAL   Final Result      1.  There is no hydronephrosis.   2.   There is an atrophic right kidney possibly due to chronic renal disease or chronic vascular disease.   3.  There is a 1.6 cm indeterminate right renal cortical mass. This could be a complex cyst or solid mass.           Assessment/Plan  Normocytic anemia  Assessment & Plan  Obtain iron panel, vitamin B12, vitamin D    Renal mass  Assessment & Plan  Renal mass noted on images from CT at [facility] July 29; usg renal showed renal mass   - will call urology     Hyperkalemia  Assessment & Plan  Resolved , monitor bmp at am       ARF (acute renal failure) (HCC)  Assessment & Plan  Acute on CKD 3, complicated by renal mass, Lisinopril, and severe prerenal azotemia.    -- continue IV fluid challenge, avoid nephrotoxic meds and doses    -- K normal, nephrology following     COVID-19 virus infection- (present on admission)  Assessment & Plan  Diagnosed July 29.  Currently asymptomatic.   -- continue Droplet, Contact, and Eye Protection         VTE prophylaxis: heparin

## 2020-08-20 LAB
ALBUMIN SERPL BCP-MCNC: 2.2 G/DL (ref 3.2–4.9)
ALBUMIN SERPL BCP-MCNC: 2.7 G/DL (ref 3.2–4.9)
BUN SERPL-MCNC: 86 MG/DL (ref 8–22)
BUN SERPL-MCNC: 91 MG/DL (ref 8–22)
CALCIUM SERPL-MCNC: 7.9 MG/DL (ref 8.5–10.5)
CALCIUM SERPL-MCNC: 8.2 MG/DL (ref 8.5–10.5)
CHLORIDE SERPL-SCNC: 95 MMOL/L (ref 96–112)
CHLORIDE SERPL-SCNC: 96 MMOL/L (ref 96–112)
CO2 SERPL-SCNC: 19 MMOL/L (ref 20–33)
CO2 SERPL-SCNC: 21 MMOL/L (ref 20–33)
CREAT SERPL-MCNC: 10.6 MG/DL (ref 0.5–1.4)
CREAT SERPL-MCNC: 10.97 MG/DL (ref 0.5–1.4)
GLUCOSE SERPL-MCNC: 77 MG/DL (ref 65–99)
GLUCOSE SERPL-MCNC: 87 MG/DL (ref 65–99)
PHOSPHATE SERPL-MCNC: 7.7 MG/DL (ref 2.5–4.5)
PHOSPHATE SERPL-MCNC: 8.1 MG/DL (ref 2.5–4.5)
POTASSIUM SERPL-SCNC: 4.6 MMOL/L (ref 3.6–5.5)
POTASSIUM SERPL-SCNC: 4.7 MMOL/L (ref 3.6–5.5)
SODIUM SERPL-SCNC: 137 MMOL/L (ref 135–145)
SODIUM SERPL-SCNC: 137 MMOL/L (ref 135–145)

## 2020-08-20 PROCEDURE — A9270 NON-COVERED ITEM OR SERVICE: HCPCS | Performed by: INTERNAL MEDICINE

## 2020-08-20 PROCEDURE — 700102 HCHG RX REV CODE 250 W/ 637 OVERRIDE(OP): Performed by: INTERNAL MEDICINE

## 2020-08-20 PROCEDURE — 80069 RENAL FUNCTION PANEL: CPT | Mod: 91

## 2020-08-20 PROCEDURE — 700105 HCHG RX REV CODE 258: Performed by: HOSPITALIST

## 2020-08-20 PROCEDURE — 36415 COLL VENOUS BLD VENIPUNCTURE: CPT

## 2020-08-20 PROCEDURE — 770021 HCHG ROOM/CARE - ISO PRIVATE

## 2020-08-20 PROCEDURE — 99232 SBSQ HOSP IP/OBS MODERATE 35: CPT | Performed by: HOSPITALIST

## 2020-08-20 PROCEDURE — 700111 HCHG RX REV CODE 636 W/ 250 OVERRIDE (IP): Performed by: INTERNAL MEDICINE

## 2020-08-20 RX ORDER — SODIUM CHLORIDE 9 MG/ML
INJECTION, SOLUTION INTRAVENOUS CONTINUOUS
Status: DISCONTINUED | OUTPATIENT
Start: 2020-08-20 | End: 2020-08-21

## 2020-08-20 RX ORDER — CALCIUM ACETATE 667 MG/1
667 TABLET ORAL
Status: DISCONTINUED | OUTPATIENT
Start: 2020-08-20 | End: 2020-08-24 | Stop reason: HOSPADM

## 2020-08-20 RX ADMIN — Medication 667 MG: at 07:22

## 2020-08-20 RX ADMIN — Medication 667 MG: at 16:10

## 2020-08-20 RX ADMIN — HEPARIN SODIUM 5000 UNITS: 5000 INJECTION, SOLUTION INTRAVENOUS; SUBCUTANEOUS at 13:16

## 2020-08-20 RX ADMIN — HEPARIN SODIUM 5000 UNITS: 5000 INJECTION, SOLUTION INTRAVENOUS; SUBCUTANEOUS at 22:02

## 2020-08-20 RX ADMIN — Medication 667 MG: at 11:47

## 2020-08-20 RX ADMIN — HEPARIN SODIUM 5000 UNITS: 5000 INJECTION, SOLUTION INTRAVENOUS; SUBCUTANEOUS at 04:47

## 2020-08-20 RX ADMIN — ASPIRIN 81 MG: 81 TABLET, COATED ORAL at 04:47

## 2020-08-20 RX ADMIN — SODIUM CHLORIDE: 9 INJECTION, SOLUTION INTRAVENOUS at 16:13

## 2020-08-20 ASSESSMENT — ENCOUNTER SYMPTOMS
DIZZINESS: 0
BLURRED VISION: 0
HEMOPTYSIS: 0
CONSTIPATION: 0
ABDOMINAL PAIN: 0
DOUBLE VISION: 0
BRUISES/BLEEDS EASILY: 0
PHOTOPHOBIA: 0
HEARTBURN: 0
PALPITATIONS: 0
COUGH: 0
NAUSEA: 0
VOMITING: 0
DEPRESSION: 0
NECK PAIN: 0
FEVER: 0
ORTHOPNEA: 0
DIARRHEA: 1
SORE THROAT: 0
MYALGIAS: 1
SHORTNESS OF BREATH: 0
HEADACHES: 0

## 2020-08-20 NOTE — CARE PLAN
Problem: Communication  Goal: The ability to communicate needs accurately and effectively will improve  Outcome: PROGRESSING AS EXPECTED  Intervention: Educate patient and significant other/support system about the plan of care, procedures, treatments, medications and allow for questions  Flowsheets (Taken 8/19/2020 1936)  Pt & Family Have Been Educated on Methods Available to Report Concerns Related to Care, Treatment, Services, and Patient Safety Issues: Yes  Note:     Intervention: Use communication aids and/or /Language Line as appropriate  Flowsheets (Taken 8/18/2020 2026)  Patient's Primary Language: Romansh  Note: Pt educated on POC and states understanding of teaching.        Problem: Safety  Goal: Will remain free from injury  Outcome: PROGRESSING AS EXPECTED  Goal: Will remain free from falls  Outcome: PROGRESSING AS EXPECTED  Intervention: Assess risk factors for falls  Flowsheets (Taken 8/19/2020 1933)  Pt Calls for Assistance: Yes  Note: Pt educated on fall risk precautions, pt states understanding of education and agrees to interventions.        Problem: Infection  Goal: Will remain free from infection  Outcome: PROGRESSING AS EXPECTED  Intervention: Assess signs and symptoms of infection  Note: Pt educated on s/s of infection and infection prevention. COVID-19 isolation precautions in place.

## 2020-08-20 NOTE — PROGRESS NOTES
Hospital Medicine Daily Progress Note    Date of Service  8/20/2020    Chief Complaint  75 y.o. male admitted 8/18/2020 with No chief complaint on file.        Hospital Course    This 75-year-old male with a past medical history significant for hypertension on lisinopril was transferred from [facility] after he was noted to be in acute renal failure with a creatinine of 12.7.  Patient was recently diagnosed with COVID on July 29; at that time he is noted to have BUN of 43 and creatinine of 1.8.  Patient reports he was having loose bowel movement, decreased appetite for some time.     Upon presentation he is found to have BUN of 111, creatinine of 12.7, potassium 6.0.  Urine sodium, urine creatinine ordered, retroperitoneal ultrasound obtained; no hydronephrosis noted, atrophic right kidney possible due to chronic renal disease, 1.6 cm right renal cortical mass.  I discussed the case with Dr. Heredia who will follow the patient as an outpatient Dr. Heredia stated that he will make an appointment for him.    Nephrology following, nephrology stated that it could likely be ATN arsen in the setting of covid, will take some time to recover. His hyperkalemia has improved.  Per infection control, he is noted to be  Recovered from infection.    Interval Problem Update  No acute events overnight, laying in the bed, potassium at 4.2, creatinine 10.72, nephro following  --Continue IV fluid, monitor I/os, avoid nephrotoxic medication.  --- Discussed the plan of the care with the patient by using     8/20:  --- No acute events overnight, denied any complaint.  Explained in detail the need to stay in the hospital.  Continue IV fluid, monitor I/O, nephrology following, follow nephrology recommendation  --- Potassium normal  --- Patient stated that he has been having loose bowel movement; less likely infectious etiology; if continues to get worse' will obtain stool ova and parasites      Consultants/Specialty  Nephrology    Code Status  Full Code    Disposition  Home once medically cleared    Review of Systems  Review of Systems   Constitutional: Negative for fever and malaise/fatigue.   HENT: Negative for sore throat.    Eyes: Negative for blurred vision, double vision and photophobia.   Respiratory: Negative for cough, hemoptysis and shortness of breath.    Cardiovascular: Negative for chest pain, palpitations and orthopnea.   Gastrointestinal: Positive for diarrhea. Negative for abdominal pain, constipation, heartburn, nausea and vomiting.   Genitourinary: Negative for dysuria.   Musculoskeletal: Positive for myalgias. Negative for neck pain.   Neurological: Negative for dizziness and headaches.   Endo/Heme/Allergies: Does not bruise/bleed easily.   Psychiatric/Behavioral: Negative for depression and suicidal ideas.        Physical Exam  Temp:  [36.1 °C (96.9 °F)-36.4 °C (97.5 °F)] 36.3 °C (97.3 °F)  Pulse:  [62-64] 62  Resp:  [16-19] 18  BP: (123-145)/(50-71) 123/54  SpO2:  [90 %-93 %] 92 %    Physical Exam  Vitals signs and nursing note reviewed.   Constitutional:       Appearance: Normal appearance.   HENT:      Head: Normocephalic and atraumatic.   Eyes:      Extraocular Movements: Extraocular movements intact.      Pupils: Pupils are equal, round, and reactive to light.   Neck:      Musculoskeletal: Neck supple.   Cardiovascular:      Rate and Rhythm: Normal rate and regular rhythm.      Pulses: Normal pulses.   Pulmonary:      Effort: Pulmonary effort is normal.      Breath sounds: Rales present.   Abdominal:      General: Abdomen is flat. Bowel sounds are normal. There is no distension.      Palpations: Abdomen is soft.   Musculoskeletal: Normal range of motion.         General: No swelling.      Right lower leg: No edema.   Skin:     General: Skin is warm.   Neurological:      General: No focal deficit present.      Mental Status: He is alert and oriented to person, place, and time.       Cranial Nerves: No cranial nerve deficit.   Psychiatric:         Mood and Affect: Mood normal.         Fluids    Intake/Output Summary (Last 24 hours) at 8/20/2020 1459  Last data filed at 8/20/2020 0722  Gross per 24 hour   Intake 250 ml   Output 950 ml   Net -700 ml       Laboratory  Recent Labs     08/17/20 2005 08/19/20  0103   WBC 11.3* 5.9   RBC 4.65* 3.41*   HEMOGLOBIN 13.7* 10.1*   HEMATOCRIT 42.6 31.4*   MCV 91.6 92.1   MCH 29.5 29.6   MCHC 32.2* 32.2*   RDW 13.5 43.8   PLATELETCT 449* 340   MPV 10.1 10.3     Recent Labs     08/18/20  1259 08/19/20  0103 08/20/20  0214   SODIUM 142 140 137   POTASSIUM 5.3 4.2 4.7   CHLORIDE 106 98 96   CO2 15* 23 21   GLUCOSE 103* 152* 87   * 97* 91*   CREATININE 11.58* 10.72* 10.97*   CALCIUM 8.4* 7.9* 7.9*                   Imaging  US-RENAL   Final Result      1.  There is no hydronephrosis.   2.  There is an atrophic right kidney possibly due to chronic renal disease or chronic vascular disease.   3.  There is a 1.6 cm indeterminate right renal cortical mass. This could be a complex cyst or solid mass.           Assessment/Plan  Normocytic anemia  Assessment & Plan  Obtain iron panel, vitamin B12, vitamin D; pending     Renal mass  Assessment & Plan  Renal mass noted on images from CT at [facility] July 29; usg renal showed renal mass   -I called urology Dr. Heredia on 8/20/2020 with stated that he will follow the patient as an outpatient.  He also said that he will make an appointment for him    Hyperkalemia  Assessment & Plan  Resolved , monitor bmp at am       ARF (acute renal failure) (HCC)  Assessment & Plan  Acute on CKD 3, complicated by renal mass   --Likely secondary to ATN, nephrology following, monitor I/os, avoid nephrotoxin, renal adjustment of medication    COVID-19 virus infection- (present on admission)  Assessment & Plan  Diagnosed July 29.  Currently asymptomatic.   --- Infection complete, patient has recovered from infection        VTE prophylaxis: heparin

## 2020-08-20 NOTE — PROGRESS NOTES
Per infection control patient meets criteria to be recovered from COVID. Okay to transfer to a medical unit.

## 2020-08-20 NOTE — PROGRESS NOTES
Assumed care of pt from offgoing RN, pt calm and cooperative with care. Assessment as documented, medicated per MAR. Slept well overnight, denies pain. OOB to bathroom with SBA, steady gait. Checked hourly for comfort and safety, report given to oncoming RN.

## 2020-08-20 NOTE — CARE PLAN
Problem: Communication  Goal: The ability to communicate needs accurately and effectively will improve  Outcome: PROGRESSING AS EXPECTED   Patient communicates with  well.  Problem: Safety  Goal: Will remain free from injury  Outcome: PROGRESSING AS EXPECTED  Goal: Will remain free from falls  Outcome: PROGRESSING AS EXPECTED     Problem: Infection  Goal: Will remain free from infection  Outcome: PROGRESSING AS EXPECTED  Patient covid positive however is technically recovered as it has been over 21 days. Patient on list to transfer to another unit.      Problem: Venous Thromboembolism (VTW)/Deep Vein Thrombosis (DVT) Prevention:  Goal: Patient will participate in Venous Thrombosis (VTE)/Deep Vein Thrombosis (DVT)Prevention Measures  Outcome: PROGRESSING AS EXPECTED     Problem: Bowel/Gastric:  Goal: Normal bowel function is maintained or improved  Outcome: PROGRESSING AS EXPECTED  Goal: Will not experience complications related to bowel motility  Outcome: PROGRESSING AS EXPECTED

## 2020-08-20 NOTE — PROGRESS NOTES
Telemetry Shift Summary     Rhythm SR/SB  HR Range 47 - 64  Ectopy rPVC  Measurements 0.12 / 0.08 / 0.42           Normal Values  Rhythm SR  HR Range    Measurements 0.12-0.20 / 0.06-0.10  / 0.30-0.52

## 2020-08-20 NOTE — PROGRESS NOTES
Patient alert and oriented, vss, denies pain. Patient with no swelling to lower extremities. Patient remains stable and nephrology following. Will monitor.

## 2020-08-20 NOTE — PROGRESS NOTES
[facility] Nephrology Daily Progress Note     Date of Service  8/20     Author: Kieran Perez Jr., MD     Chief Complaint  74 yo male with h/o HTN, who presented to [facility] with ARMANDO. He was found to be COVID positive. He reportedly was on lisinopril, but patient states he has not taken that for years. He denies NSAID use. He does report some diarrhea. He denies change in urination. He reports no pain. He was found to have creatinine of 12, BUN of 112 and K of 6.0 so was transferred down to [facility].   Since admission, he reports making some urine. He was started on sodium bicarbonate gtt. He asks why he is getting his blood checked so often.   I d/w him possible need for dialysis. He seems sceptical that he needs it, but I d/w him if K keeps rising, his heart can stop.   Renal US shows atrophic right kidney as well as right renal mass. No obstruction of left kidney.      Daily Nephrology Summary:   8/19 - K better. Creatine falling slowly. He is making urine.  used. Patient upset about not being sent home. He also is yelling at times without face mask on. I told him eventually I had to leave given risk of COVID transmission and I had said everything I could say about his kidney function.   8/20 - Patient reportedly the same. I did not see patient personally but talked with hospitalist and RN outside room. No symptoms of renal failure. Making ample UOP.          Physical Exam  Vitals:    08/20/20 1141   BP: 123/54   Pulse: 62   Resp: 18   Temp: 36.3 °C (97.3 °F)   SpO2: 92%          Physical Exam  Alert, nad  No edema  Non labored breathing        Fluids       Intake/Output Summary (Last 24 hours) at 8/20/2020 1204  Last data filed at 8/20/2020 0722  Gross per 24 hour   Intake 250 ml   Output 950 ml   Net -700 ml           Labs    Lab Results   Component Value Date/Time    SODIUM 137 08/20/2020 02:14 AM    POTASSIUM 4.7 08/20/2020 02:14 AM    CHLORIDE 96 08/20/2020 02:14 AM    CO2 21  08/20/2020 02:14 AM    GLUCOSE 87 08/20/2020 02:14 AM    BUN 91 () 08/20/2020 02:14 AM    CREATININE 10.97 () 08/20/2020 02:14 AM       Recent Labs     08/17/20 2005 08/19/20  0103   WBC 11.3* 5.9   RBC 4.65* 3.41*   HEMOGLOBIN 13.7* 10.1*   HEMATOCRIT 42.6 31.4*   MCV 91.6 92.1   MCH 29.5 29.6   RDW 13.5 43.8   PLATELETCT 449* 340   MPV 10.1 10.3   NEUTSPOLYS  --  78.60*   LYMPHOCYTES  --  11.40*   MONOCYTES  --  7.10   EOSINOPHILS  --  1.90   BASOPHILS  --  0.50       Assessment/Plan  74 yo male with h/o HTN, CKD stage 3 with creatinine 1.8 on 7/29, who presents with creatinine of 12 in setting of COVID positive     1) ARMANDO - suspect mixture of pre-renal/ATN given diarrhea and COVID positive. FeNa 7.9%, c/w ATN.   2) COVID Positive  3) Hyperkalemia - resolved  4) Possible CKD stage 3 - creaitnine 1.8 on 7/29, 2.5 weeks prior to admission  5) Diarrhea   6) Azotemia - without signs of uremia.   7) Renal mass - right atrophic kidney. Will need evaluation at later date.      PLAN:  1. No need for dialysis  2. However, would recommend patient staying inpatient until Creatinine shows 2 days of substantial decrease.     Kieran Perez Jr, MD  [facility] Nephrology

## 2020-08-21 LAB
25(OH)D3 SERPL-MCNC: 24 NG/ML (ref 30–100)
ANION GAP SERPL CALC-SCNC: 23 MMOL/L (ref 7–16)
BUN SERPL-MCNC: 85 MG/DL (ref 8–22)
CALCIUM SERPL-MCNC: 7.6 MG/DL (ref 8.5–10.5)
CHLORIDE SERPL-SCNC: 99 MMOL/L (ref 96–112)
CO2 SERPL-SCNC: 16 MMOL/L (ref 20–33)
CREAT SERPL-MCNC: 10.19 MG/DL (ref 0.5–1.4)
FERRITIN SERPL-MCNC: 695 NG/ML (ref 22–322)
GLUCOSE SERPL-MCNC: 72 MG/DL (ref 65–99)
IRON SATN MFR SERPL: 37 % (ref 15–55)
IRON SERPL-MCNC: 50 UG/DL (ref 50–180)
POTASSIUM SERPL-SCNC: 4.4 MMOL/L (ref 3.6–5.5)
SODIUM SERPL-SCNC: 138 MMOL/L (ref 135–145)
TIBC SERPL-MCNC: 135 UG/DL (ref 250–450)
TSH SERPL DL<=0.005 MIU/L-ACNC: 0.85 UIU/ML (ref 0.38–5.33)
UIBC SERPL-MCNC: 85 UG/DL (ref 110–370)
VIT B12 SERPL-MCNC: 1746 PG/ML (ref 211–911)

## 2020-08-21 PROCEDURE — A9270 NON-COVERED ITEM OR SERVICE: HCPCS | Performed by: INTERNAL MEDICINE

## 2020-08-21 PROCEDURE — A9270 NON-COVERED ITEM OR SERVICE: HCPCS | Performed by: HOSPITALIST

## 2020-08-21 PROCEDURE — 99232 SBSQ HOSP IP/OBS MODERATE 35: CPT | Performed by: HOSPITALIST

## 2020-08-21 PROCEDURE — 82306 VITAMIN D 25 HYDROXY: CPT

## 2020-08-21 PROCEDURE — 700105 HCHG RX REV CODE 258: Performed by: HOSPITALIST

## 2020-08-21 PROCEDURE — 36415 COLL VENOUS BLD VENIPUNCTURE: CPT

## 2020-08-21 PROCEDURE — 770021 HCHG ROOM/CARE - ISO PRIVATE

## 2020-08-21 PROCEDURE — 83540 ASSAY OF IRON: CPT

## 2020-08-21 PROCEDURE — 700111 HCHG RX REV CODE 636 W/ 250 OVERRIDE (IP): Performed by: INTERNAL MEDICINE

## 2020-08-21 PROCEDURE — 700111 HCHG RX REV CODE 636 W/ 250 OVERRIDE (IP): Performed by: HOSPITALIST

## 2020-08-21 PROCEDURE — 700102 HCHG RX REV CODE 250 W/ 637 OVERRIDE(OP): Performed by: INTERNAL MEDICINE

## 2020-08-21 PROCEDURE — 700102 HCHG RX REV CODE 250 W/ 637 OVERRIDE(OP): Performed by: HOSPITALIST

## 2020-08-21 PROCEDURE — 82728 ASSAY OF FERRITIN: CPT

## 2020-08-21 PROCEDURE — 80048 BASIC METABOLIC PNL TOTAL CA: CPT

## 2020-08-21 PROCEDURE — 82607 VITAMIN B-12: CPT

## 2020-08-21 PROCEDURE — 84443 ASSAY THYROID STIM HORMONE: CPT

## 2020-08-21 PROCEDURE — 83550 IRON BINDING TEST: CPT

## 2020-08-21 RX ORDER — ERGOCALCIFEROL 1.25 MG/1
50000 CAPSULE ORAL
Status: DISCONTINUED | OUTPATIENT
Start: 2020-08-21 | End: 2020-08-24 | Stop reason: HOSPADM

## 2020-08-21 RX ADMIN — Medication 667 MG: at 17:29

## 2020-08-21 RX ADMIN — HEPARIN SODIUM 5000 UNITS: 5000 INJECTION, SOLUTION INTRAVENOUS; SUBCUTANEOUS at 14:09

## 2020-08-21 RX ADMIN — SODIUM CHLORIDE: 9 INJECTION, SOLUTION INTRAVENOUS at 00:01

## 2020-08-21 RX ADMIN — SODIUM CHLORIDE: 9 INJECTION, SOLUTION INTRAVENOUS at 08:09

## 2020-08-21 RX ADMIN — Medication 667 MG: at 12:21

## 2020-08-21 RX ADMIN — HEPARIN SODIUM 5000 UNITS: 5000 INJECTION, SOLUTION INTRAVENOUS; SUBCUTANEOUS at 05:13

## 2020-08-21 RX ADMIN — ASPIRIN 81 MG: 81 TABLET, COATED ORAL at 05:13

## 2020-08-21 RX ADMIN — ERGOCALCIFEROL 50000 UNITS: 1.25 CAPSULE ORAL at 09:30

## 2020-08-21 RX ADMIN — HEPARIN SODIUM 5000 UNITS: 5000 INJECTION, SOLUTION INTRAVENOUS; SUBCUTANEOUS at 21:20

## 2020-08-21 RX ADMIN — Medication 667 MG: at 05:40

## 2020-08-21 RX ADMIN — SODIUM BICARBONATE: 84 INJECTION, SOLUTION INTRAVENOUS at 21:19

## 2020-08-21 ASSESSMENT — ENCOUNTER SYMPTOMS
FEVER: 0
COUGH: 0
PHOTOPHOBIA: 0
ORTHOPNEA: 0
HEARTBURN: 0
ABDOMINAL PAIN: 0
CONSTIPATION: 0
BRUISES/BLEEDS EASILY: 0
VOMITING: 0
SHORTNESS OF BREATH: 0
PALPITATIONS: 0
NAUSEA: 0
HEMOPTYSIS: 0
SENSORY CHANGE: 0
DEPRESSION: 0
MYALGIAS: 1
DIARRHEA: 1
DOUBLE VISION: 0
BLURRED VISION: 0
NECK PAIN: 0

## 2020-08-21 NOTE — CARE PLAN
Problem: Knowledge Deficit  Goal: Knowledge of the prescribed therapeutic regimen will improve  Outcome: PROGRESSING AS EXPECTED  Intervention: Discuss information regarding therpeutic regimen and document in education  Note: Patient educated via  services.      Problem: Psychosocial Needs:  Goal: Level of anxiety will decrease  Outcome: PROGRESSING AS EXPECTED  Intervention: Identify and develop with patient strategies to cope with anxiety triggers  Note: Patient very anxious about this admission. Talked through concerns via interpretor and addressed. Patient states he feels better about this hospitalization.

## 2020-08-21 NOTE — CARE PLAN
Problem: Nutritional:  Goal: Achieve adequate nutritional intake  Description: Patient will consume 50% of meals  Outcome: PROGRESSING SLOWER THAN EXPECTED

## 2020-08-21 NOTE — PROGRESS NOTES
[facility] Medicine Daily Progress Note    Date of Service  8/21/2020    Chief Complaint  75 y.o. male admitted 8/18/2020 with No chief complaint on file.        Hospital Course    This 75-year-old male with a past medical history significant for hypertension on lisinopril was transferred from [facility] after he was noted to be in acute renal failure with a creatinine of 12.7.  Patient was recently diagnosed with COVID on July 29; at that time he is noted to have BUN of 43 and creatinine of 1.8.  Patient reports he was having loose bowel movement, decreased appetite for some time.     Upon presentation he is found to have BUN of 111, creatinine of 12.7, potassium 6.0.  Urine sodium, urine creatinine ordered, retroperitoneal ultrasound obtained; no hydronephrosis noted, atrophic right kidney possible due to chronic renal disease, 1.6 cm right renal cortical mass.  I discussed the case with Dr. Heredia who will follow the patient as an outpatient Dr. Heredia stated that he will make an appointment for him.    Nephrology following, nephrology stated that it could likely be ATN arsen in the setting of covid, will take some time to recover. His hyperkalemia has improved.  Per infection control, he is noted to be  Recovered from infection.    Interval Problem Update  No acute events overnight, laying in the bed, potassium at 4.2, creatinine 10.72, nephro following  --Continue IV fluid, monitor I/os, avoid nephrotoxic medication.  --- Discussed the plan of the care with the patient by using     8/20:  --- No acute events overnight, denied any complaint.  Explained in detail the need to stay in the hospital.  Continue IV fluid, monitor I/O, nephrology following, follow nephrology recommendation  --- Potassium normal  --- Patient stated that he has been having loose bowel movement; less likely infectious etiology; if continues to get worse' will obtain stool ova and parasites     8/21:   --- No acute events  overnight, laying in bed,  Monitor I/Os, daily weight     --- avoid nephrotoxin , nephrology recs     Consultants/Specialty  Nephrology    Code Status  Full Code    Disposition  Home once medically cleared    Review of Systems  Review of Systems   Constitutional: Negative for fever and malaise/fatigue.   HENT: Negative for congestion.    Eyes: Negative for blurred vision, double vision and photophobia.   Respiratory: Negative for cough, hemoptysis and shortness of breath.    Cardiovascular: Negative for chest pain, palpitations and orthopnea.   Gastrointestinal: Positive for diarrhea. Negative for abdominal pain, constipation, heartburn, nausea and vomiting.   Genitourinary: Negative for dysuria and urgency.   Musculoskeletal: Positive for myalgias. Negative for neck pain.   Neurological: Negative for sensory change.   Endo/Heme/Allergies: Does not bruise/bleed easily.   Psychiatric/Behavioral: Negative for depression and suicidal ideas.        Physical Exam  Temp:  [35.9 °C (96.6 °F)-36.8 °C (98.3 °F)] 35.9 °C (96.7 °F)  Pulse:  [61-63] 63  Resp:  [16] 16  BP: (126-145)/(45-56) 127/49  SpO2:  [91 %-94 %] 91 %    Physical Exam  Vitals signs and nursing note reviewed.   Constitutional:       Appearance: Normal appearance.   HENT:      Head: Normocephalic and atraumatic.   Eyes:      Extraocular Movements: Extraocular movements intact.   Neck:      Musculoskeletal: Neck supple.   Cardiovascular:      Rate and Rhythm: Normal rate and regular rhythm.      Pulses: Normal pulses.   Pulmonary:      Effort: Pulmonary effort is normal.      Breath sounds: Rales present.   Abdominal:      General: Abdomen is flat. Bowel sounds are normal. There is no distension.      Palpations: Abdomen is soft.   Musculoskeletal: Normal range of motion.         General: No swelling.      Right lower leg: No edema.   Skin:     General: Skin is warm.   Neurological:      General: No focal deficit present.      Mental Status: He is alert and  oriented to person, place, and time.      Cranial Nerves: No cranial nerve deficit.   Psychiatric:         Mood and Affect: Mood normal.         Fluids    Intake/Output Summary (Last 24 hours) at 8/21/2020 1221  Last data filed at 8/21/2020 0800  Gross per 24 hour   Intake 342.92 ml   Output 570 ml   Net -227.08 ml       Laboratory  Recent Labs     08/19/20  0103   WBC 5.9   RBC 3.41*   HEMOGLOBIN 10.1*   HEMATOCRIT 31.4*   MCV 92.1   MCH 29.6   MCHC 32.2*   RDW 43.8   PLATELETCT 340   MPV 10.3     Recent Labs     08/20/20  0214 08/20/20  2159 08/21/20  0126   SODIUM 137 137 138   POTASSIUM 4.7 4.6 4.4   CHLORIDE 96 95* 99   CO2 21 19* 16*   GLUCOSE 87 77 72   BUN 91* 86* 85*   CREATININE 10.97* 10.60* 10.19*   CALCIUM 7.9* 8.2* 7.6*                   Imaging  US-RENAL   Final Result      1.  There is no hydronephrosis.   2.  There is an atrophic right kidney possibly due to chronic renal disease or chronic vascular disease.   3.  There is a 1.6 cm indeterminate right renal cortical mass. This could be a complex cyst or solid mass.           Assessment/Plan  Normocytic anemia  Assessment & Plan   Reviewed iron Panel  And vitamin b12    -- noted to have  Mild vitamin  D     Renal mass  Assessment & Plan  Renal mass noted on images from CT at [facility] July 29; usg renal showed renal mass   -I called urology Dr. Heredia on 8/20/2020 with stated that he will follow the patient as an outpatient.  He also said that he will make an appointment for him    Hyperkalemia  Assessment & Plan  Resolved     ARF (acute renal failure) (HCC)  Assessment & Plan  Acute on CKD 3, complicated by renal mass   --Likely secondary to ATN, nephrology following, monitor I/Os, avoid nephrotoxin, renal adjustment of medication    COVID-19 virus infection- (present on admission)  Assessment & Plan  Diagnosed July 29.  Currently asymptomatic.   --- Infection complete, patient has recovered from infection       VTE prophylaxis:  heparin

## 2020-08-21 NOTE — DIETARY
Nutrition Services: Re-Screen Poor PO  Day 3 of admit.  Jared Ballesteros is a 75 y.o. male with admitting DX of Acute Renal Failure, COVID-19 Positive, Generalized Weakness, Decreased PO Intake, Decreased Appetite, ARMANDO (acute kidney injury)     Pt is currently on renal, 2 gram sodium diet. Per chart pt PO 0-50% most meals, couple of meals %. Will add Boost Glucose Control TID to meals. Wt 8/18: 56.2 kg via bed scale.     Labs 8/20: BUN 86, Creatinine 10.60, Phosphorus 7.7  Meds: phos-lo, pericolace, vitamin D  Fluids: NS infusion 125 mL/hr  Skin: Per wound team note 8/17: DTPI left posterior ear POA and DTPI right ear POA    Malnutrition Risk: No criteria noted at this time.     Recommendations/Plan:  1. Encourage intake of meals  2. Document intake of all meals as % taken in ADL's to provide interdisciplinary communication across all shifts.   3. Monitor weight.  4. Nutrition rep will continue to see patient for ongoing meal and snack preferences.    RD following

## 2020-08-21 NOTE — PROGRESS NOTES
"Bedside report completed with day shift RN Amanda and patient. Patient is not a fall risk and patient calls appropriately. Hourly rounding continued. Patient vital signs /56   Pulse 61   Temp 35.9 °C (96.6 °F) (Temporal)   Resp 16   Ht 1.549 m (5' 1\")   Wt 56.2 kg (123 lb 14.4 oz)   SpO2 93%   BMI 23.41 kg/m² . Full assessment completed in flowsheet. Assumed patient care at 1900.     Patient states he is having diarrhea and small bowel movements. Patient showed this nurse diarhea x3 on NOC shift and it was yellow, frothy, watery output. Will continue to monitor and update as appropriate.   "

## 2020-08-21 NOTE — PROGRESS NOTES
[facility] Nephrology Daily Progress Note     Date of Service       Author: Kieran Perez Jr., MD     76 yo male with h/o HTN, who presented to [facility] with ARMANDO. He was found to be COVID positive. He reportedly was on lisinopril, but patient states he has not taken that for years. He denies NSAID use. He does report some diarrhea. He denies change in urination. He reports no pain. He was found to have creatinine of 12, BUN of 112 and K of 6.0 so was transferred down to [facility].   Since admission, he reports making some urine. He was started on sodium bicarbonate gtt. He asks why he is getting his blood checked so often.   I d/w him possible need for dialysis. He seems sceptical that he needs it, but I d/w him if K keeps rising, his heart can stop.   Renal US shows atrophic right kidney as well as right renal mass. No obstruction of left kidney.      Daily Nephrology Summary:   8/19 - K better. Creatine falling slowly. He is making urine.  used. Patient upset about not being sent home. He also is yelling at times without face mask on. I told him eventually I had to leave given risk of COVID transmission and I had said everything I could say about his kidney function.   8/20 - Patient reportedly the same. I did not see patient personally but talked with hospitalist and RN outside room. No symptoms of renal failure. Making ample UOP.   8/21 - Seen from doorway. NS overnight. Creatinine still high, but BUN still decreasing. Making ample UOP.      Review of Systems  + diarrhea    Physical Exam  Vitals:    08/21/20 0800   BP: 127/49   Pulse: 63   Resp: 16   Temp: 35.9 °C (96.7 °F)   SpO2: 91%          Physical Exam  Talking non labored  NAD  No edema       Fluids       Intake/Output Summary (Last 24 hours) at 8/21/2020 0948  Last data filed at 8/21/2020 0800  Gross per 24 hour   Intake 342.92 ml   Output 570 ml   Net -227.08 ml           Labs    Lab Results   Component Value Date/Time     SODIUM 137 08/20/2020 09:59 PM    POTASSIUM 4.6 08/20/2020 09:59 PM    CHLORIDE 95 (L) 08/20/2020 09:59 PM    CO2 19 (L) 08/20/2020 09:59 PM    GLUCOSE 77 08/20/2020 09:59 PM    BUN 86 (HH) 08/20/2020 09:59 PM    CREATININE 10.60 (HH) 08/20/2020 09:59 PM       Recent Labs     08/19/20  0103   WBC 5.9   RBC 3.41*   HEMOGLOBIN 10.1*   HEMATOCRIT 31.4*   MCV 92.1   MCH 29.6   RDW 43.8   PLATELETCT 340   MPV 10.3   NEUTSPOLYS 78.60*   LYMPHOCYTES 11.40*   MONOCYTES 7.10   EOSINOPHILS 1.90   BASOPHILS 0.50       Assessment/Plan  74 yo male with h/o HTN, CKD stage 3 with creatinine 1.8 on 7/29, who presents with creatinine of 12 in setting of COVID positive     1) ARMANDO - suspect mixture of pre-renal/ATN given diarrhea and COVID positive. FeNa 7.9%, c/w ATN.   2) COVID Positive  3) Hyperkalemia - resolved  4) Possible CKD stage 3 - creaitnine 1.8 on 7/29, 2.5 weeks prior to admission  5) Diarrhea   6) Azotemia - without signs of uremia.   7) Renal mass - right atrophic kidney. Will need evaluation at later date.      PLAN:  1. No need for dialysis  2. Recommend 1/2 NS with 75 meq of bicarbonate as replacement fluid so we don't run into acidosis issues again. I d/w hospitalist.     Kieran Perez Jr, MD  [facility] Nephrology

## 2020-08-21 NOTE — DISCHARGE PLANNING
Hospital Care Management Discharge Planning       Anticipated Discharge Disposition:   · Home     Action:   · Chart reviewed. BUN/CR still elevated. Nephrology following.     Barriers to Discharge:   · Elevated BUN/CR     Plan:    · Continue to provide support services and assistance with discharge planning as needed.

## 2020-08-22 LAB
ANION GAP SERPL CALC-SCNC: 20 MMOL/L (ref 7–16)
BUN SERPL-MCNC: 69 MG/DL (ref 8–22)
CALCIUM SERPL-MCNC: 7.6 MG/DL (ref 8.5–10.5)
CHLORIDE SERPL-SCNC: 94 MMOL/L (ref 96–112)
CO2 SERPL-SCNC: 21 MMOL/L (ref 20–33)
CREAT SERPL-MCNC: 8.28 MG/DL (ref 0.5–1.4)
GLUCOSE SERPL-MCNC: 81 MG/DL (ref 65–99)
POTASSIUM SERPL-SCNC: 3.7 MMOL/L (ref 3.6–5.5)
SODIUM SERPL-SCNC: 135 MMOL/L (ref 135–145)

## 2020-08-22 PROCEDURE — 700105 HCHG RX REV CODE 258: Performed by: HOSPITALIST

## 2020-08-22 PROCEDURE — 80048 BASIC METABOLIC PNL TOTAL CA: CPT

## 2020-08-22 PROCEDURE — 700111 HCHG RX REV CODE 636 W/ 250 OVERRIDE (IP): Performed by: INTERNAL MEDICINE

## 2020-08-22 PROCEDURE — 770001 HCHG ROOM/CARE - MED/SURG/GYN PRIV*

## 2020-08-22 PROCEDURE — A9270 NON-COVERED ITEM OR SERVICE: HCPCS | Performed by: INTERNAL MEDICINE

## 2020-08-22 PROCEDURE — 99232 SBSQ HOSP IP/OBS MODERATE 35: CPT | Performed by: HOSPITALIST

## 2020-08-22 PROCEDURE — 700102 HCHG RX REV CODE 250 W/ 637 OVERRIDE(OP): Performed by: INTERNAL MEDICINE

## 2020-08-22 PROCEDURE — 36415 COLL VENOUS BLD VENIPUNCTURE: CPT

## 2020-08-22 PROCEDURE — 700111 HCHG RX REV CODE 636 W/ 250 OVERRIDE (IP): Performed by: HOSPITALIST

## 2020-08-22 RX ADMIN — HEPARIN SODIUM 5000 UNITS: 5000 INJECTION, SOLUTION INTRAVENOUS; SUBCUTANEOUS at 05:47

## 2020-08-22 RX ADMIN — ASPIRIN 81 MG: 81 TABLET, COATED ORAL at 05:46

## 2020-08-22 RX ADMIN — Medication 667 MG: at 05:46

## 2020-08-22 RX ADMIN — HEPARIN SODIUM 5000 UNITS: 5000 INJECTION, SOLUTION INTRAVENOUS; SUBCUTANEOUS at 21:03

## 2020-08-22 RX ADMIN — SODIUM BICARBONATE: 84 INJECTION, SOLUTION INTRAVENOUS at 14:54

## 2020-08-22 RX ADMIN — Medication 667 MG: at 16:14

## 2020-08-22 RX ADMIN — SODIUM BICARBONATE: 84 INJECTION, SOLUTION INTRAVENOUS at 05:47

## 2020-08-22 RX ADMIN — Medication 667 MG: at 11:52

## 2020-08-22 RX ADMIN — HEPARIN SODIUM 5000 UNITS: 5000 INJECTION, SOLUTION INTRAVENOUS; SUBCUTANEOUS at 14:53

## 2020-08-22 ASSESSMENT — ENCOUNTER SYMPTOMS
FEVER: 0
ABDOMINAL PAIN: 0
COUGH: 0
DIARRHEA: 1
BRUISES/BLEEDS EASILY: 0
PALPITATIONS: 0
SHORTNESS OF BREATH: 0
ORTHOPNEA: 0
CONSTIPATION: 0
HEARTBURN: 0
VOMITING: 0
HEMOPTYSIS: 0
NAUSEA: 0
SORE THROAT: 0
WEAKNESS: 1
MYALGIAS: 1
NECK PAIN: 0
DEPRESSION: 0
BLURRED VISION: 0
PHOTOPHOBIA: 0

## 2020-08-22 NOTE — PROGRESS NOTES
No labs drawn today. I have ordered BMP. Please call me with any issues. I will follow-up on labs later today.      [de-identified] : 46 y/o man with borderline HTN and borderline sugars is here for f/u. He has changed his diet-signfiicantly cut carbs.\par He has no CP or SOB. He is cocnerned about he had two nights where his Apple Watch showed elevated HR to 125-140.\par He denies day time sleepiness and doesn't snore.\par

## 2020-08-22 NOTE — PROGRESS NOTES
Patient resting quietly in bed. Preparing for sleep. Intake poor. Output adequate. Lips are dry and peeling. Noticed he was picking at bottom lip and lip balm given. Drinking some water.

## 2020-08-22 NOTE — PROGRESS NOTES
[facility] Medicine Daily Progress Note    Date of Service  8/22/2020    Chief Complaint  75 y.o. male admitted 8/18/2020 with No chief complaint on file.        Hospital Course    This 75-year-old male with a past medical history significant for hypertension on lisinopril was transferred from [facility] after he was noted to be in acute renal failure with a creatinine of 12.7.  Patient was recently diagnosed with COVID on July 29; at that time he is noted to have BUN of 43 and creatinine of 1.8.  Patient reports he was having loose bowel movement, decreased appetite for some time.     Upon presentation he is found to have BUN of 111, creatinine of 12.7, potassium 6.0.  Urine sodium, urine creatinine ordered, retroperitoneal ultrasound obtained; no hydronephrosis noted, atrophic right kidney possible due to chronic renal disease, 1.6 cm right renal cortical mass.  I discussed the case with Dr. Heredia who will follow the patient as an outpatient Dr. Heredia stated that he will make an appointment for him.    Nephrology following, nephrology stated that it could likely be ATN arsen in the setting of covid, will take some time to recover. His hyperkalemia has improved.  Per infection control, he is noted to be  Recovered from infection.    Interval Problem Update  No acute events overnight, laying in the bed, potassium at 4.2, creatinine 10.72, nephro following  --Continue IV fluid, monitor I/os, avoid nephrotoxic medication.  --- Discussed the plan of the care with the patient by using     8/20:  --- No acute events overnight, denied any complaint.  Explained in detail the need to stay in the hospital.  Continue IV fluid, monitor I/O, nephrology following, follow nephrology recommendation  --- Potassium normal  --- Patient stated that he has been having loose bowel movement; less likely infectious etiology; if continues to get worse' will obtain stool ova and parasites     8/21:   --- No acute events  overnight, laying in bed,  Monitor I/Os, daily weight     --- avoid nephrotoxin , nephrology recs     8/22: Patient has been considered recovered from covid  Infection as per CDC guidelines, no acute events overnight, laying in bed, continue half-normal saline with 75 mEq of bicarb, repeat BMP pending, nephrology following.   -discussed the plan of care by using     Consultants/Specialty  Nephrology    Code Status  Full Code    Disposition  Home once medically cleared    Review of Systems  Review of Systems   Constitutional: Negative for fever.   HENT: Negative for sore throat.    Eyes: Negative for blurred vision and photophobia.   Respiratory: Negative for cough, hemoptysis and shortness of breath.    Cardiovascular: Negative for chest pain, palpitations, orthopnea and leg swelling.   Gastrointestinal: Positive for diarrhea. Negative for abdominal pain, constipation, heartburn, nausea and vomiting.   Genitourinary: Negative for dysuria and frequency.   Musculoskeletal: Positive for myalgias. Negative for neck pain.   Neurological: Positive for weakness.   Endo/Heme/Allergies: Does not bruise/bleed easily.   Psychiatric/Behavioral: Negative for depression and suicidal ideas.        Physical Exam  Temp:  [36.1 °C (96.9 °F)-36.3 °C (97.4 °F)] 36.1 °C (97 °F)  Pulse:  [60-72] 60  Resp:  [16-18] 16  BP: (122-151)/(45-65) 122/65  SpO2:  [90 %-93 %] 92 %    Physical Exam  Vitals signs and nursing note reviewed.   Constitutional:       Appearance: Normal appearance.   HENT:      Head: Normocephalic and atraumatic.   Eyes:      Extraocular Movements: Extraocular movements intact.   Neck:      Musculoskeletal: Neck supple.   Cardiovascular:      Rate and Rhythm: Normal rate and regular rhythm.      Pulses: Normal pulses.   Pulmonary:      Effort: Pulmonary effort is normal.      Breath sounds: Rales present.   Abdominal:      General: Abdomen is flat. Bowel sounds are normal. There is no distension.       Palpations: Abdomen is soft.   Musculoskeletal: Normal range of motion.         General: No swelling.      Right lower leg: No edema.   Skin:     General: Skin is warm.   Neurological:      General: No focal deficit present.      Mental Status: He is alert and oriented to person, place, and time.      Cranial Nerves: No cranial nerve deficit.      Sensory: No sensory deficit.   Psychiatric:         Mood and Affect: Mood normal.         Fluids    Intake/Output Summary (Last 24 hours) at 8/22/2020 0842  Last data filed at 8/22/2020 0630  Gross per 24 hour   Intake 120 ml   Output 1225 ml   Net -1105 ml       Laboratory      Recent Labs     08/20/20  0214 08/20/20  2159 08/21/20  0126   SODIUM 137 137 138   POTASSIUM 4.7 4.6 4.4   CHLORIDE 96 95* 99   CO2 21 19* 16*   GLUCOSE 87 77 72   BUN 91* 86* 85*   CREATININE 10.97* 10.60* 10.19*   CALCIUM 7.9* 8.2* 7.6*                   Imaging  US-RENAL   Final Result      1.  There is no hydronephrosis.   2.  There is an atrophic right kidney possibly due to chronic renal disease or chronic vascular disease.   3.  There is a 1.6 cm indeterminate right renal cortical mass. This could be a complex cyst or solid mass.           Assessment/Plan  Normocytic anemia  Assessment & Plan   Reviewed iron Panel  And vitamin b12    -- noted to have  Mild vitamin  D     Renal mass  Assessment & Plan  Renal mass noted on images from CT at [facility] July 29; usg renal showed renal mass   -I called urology Dr. Heredia on 8/20/2020 with stated that he will follow the patient as an outpatient.  He also said that he will make an appointment for him    Hyperkalemia  Assessment & Plan  Resolved , potassium noted at 4.4, monitor    ARF (acute renal failure) (HCC)  Assessment & Plan  Acute on CKD 3, complicated by renal mass   --Likely secondary to ATN, nephrology following, monitor I/Os, avoid nephrotoxin, renal adjustment of medication  Patient to have low bicarb at 16 along with loose  bowel movement, he was started on half NS with 75 mEq of bicarb    COVID-19 virus infection- (present on admission)  Assessment & Plan  Diagnosed July 29.  Currently asymptomatic.   --- Infection complete, patient has recovered from infection       VTE prophylaxis: heparin

## 2020-08-22 NOTE — CARE PLAN
Problem: Communication  Goal: The ability to communicate needs accurately and effectively will improve  Outcome: PROGRESSING SLOWER THAN EXPECTED     Problem: Safety  Goal: Will remain free from injury  Outcome: PROGRESSING AS EXPECTED  Goal: Will remain free from falls  Outcome: PROGRESSING AS EXPECTED     Problem: Infection  Goal: Will remain free from infection  Outcome: PROGRESSING AS EXPECTED     Problem: Venous Thromboembolism (VTW)/Deep Vein Thrombosis (DVT) Prevention:  Goal: Patient will participate in Venous Thrombosis (VTE)/Deep Vein Thrombosis (DVT)Prevention Measures  Outcome: PROGRESSING AS EXPECTED     Problem: Bowel/Gastric:  Goal: Normal bowel function is maintained or improved  Outcome: PROGRESSING AS EXPECTED  Goal: Will not experience complications related to bowel motility  Outcome: PROGRESSING AS EXPECTED     Problem: Knowledge Deficit  Goal: Knowledge of disease process/condition, treatment plan, diagnostic tests, and medications will improve  Outcome: PROGRESSING AS EXPECTED  Goal: Knowledge of the prescribed therapeutic regimen will improve  Outcome: PROGRESSING AS EXPECTED     Problem: Discharge Barriers/Planning  Goal: Patient's continuum of care needs will be met  Outcome: PROGRESSING AS EXPECTED     Problem: Respiratory:  Goal: Respiratory status will improve  Outcome: PROGRESSING AS EXPECTED     Problem: Psychosocial Needs:  Goal: Level of anxiety will decrease  Outcome: PROGRESSING AS EXPECTED

## 2020-08-22 NOTE — PROGRESS NOTES
Reviewed labs. ATN continues to improve.     If creatinine better tomorrow, diarrhea resolved. I am ok with discharge with close follow-up.     He should be instructed to avoid all NSAIDs and lisinopril.     Labs ordered for in the AM.   I will stop IVF as acidosis resolved.   Please call me with any questions.

## 2020-08-23 PROBLEM — E55.9 VITAMIN D DEFICIENCY: Status: ACTIVE | Noted: 2020-08-23

## 2020-08-23 LAB
ALBUMIN SERPL BCP-MCNC: 2.5 G/DL (ref 3.2–4.9)
ANION GAP SERPL CALC-SCNC: 19 MMOL/L (ref 7–16)
BASOPHILS # BLD AUTO: 0.6 % (ref 0–1.8)
BASOPHILS # BLD: 0.03 K/UL (ref 0–0.12)
BUN SERPL-MCNC: 66 MG/DL (ref 8–22)
CALCIUM SERPL-MCNC: 8 MG/DL (ref 8.5–10.5)
CHLORIDE SERPL-SCNC: 94 MMOL/L (ref 96–112)
CO2 SERPL-SCNC: 22 MMOL/L (ref 20–33)
CREAT SERPL-MCNC: 7.8 MG/DL (ref 0.5–1.4)
EOSINOPHIL # BLD AUTO: 0.07 K/UL (ref 0–0.51)
EOSINOPHIL NFR BLD: 1.4 % (ref 0–6.9)
ERYTHROCYTE [DISTWIDTH] IN BLOOD BY AUTOMATED COUNT: 40.5 FL (ref 35.9–50)
GLUCOSE SERPL-MCNC: 91 MG/DL (ref 65–99)
HCT VFR BLD AUTO: 33.8 % (ref 42–52)
HGB BLD-MCNC: 11 G/DL (ref 14–18)
IMM GRANULOCYTES # BLD AUTO: 0.01 K/UL (ref 0–0.11)
IMM GRANULOCYTES NFR BLD AUTO: 0.2 % (ref 0–0.9)
LYMPHOCYTES # BLD AUTO: 0.82 K/UL (ref 1–4.8)
LYMPHOCYTES NFR BLD: 16.6 % (ref 22–41)
MAGNESIUM SERPL-MCNC: 1.9 MG/DL (ref 1.5–2.5)
MCH RBC QN AUTO: 29.2 PG (ref 27–33)
MCHC RBC AUTO-ENTMCNC: 32.5 G/DL (ref 33.7–35.3)
MCV RBC AUTO: 89.7 FL (ref 81.4–97.8)
MONOCYTES # BLD AUTO: 0.47 K/UL (ref 0–0.85)
MONOCYTES NFR BLD AUTO: 9.5 % (ref 0–13.4)
NEUTROPHILS # BLD AUTO: 3.54 K/UL (ref 1.82–7.42)
NEUTROPHILS NFR BLD: 71.7 % (ref 44–72)
NRBC # BLD AUTO: 0 K/UL
NRBC BLD-RTO: 0 /100 WBC
PHOSPHATE SERPL-MCNC: 6.6 MG/DL (ref 2.5–4.5)
PLATELET # BLD AUTO: 240 K/UL (ref 164–446)
PMV BLD AUTO: 10.5 FL (ref 9–12.9)
POTASSIUM SERPL-SCNC: 3.6 MMOL/L (ref 3.6–5.5)
RBC # BLD AUTO: 3.77 M/UL (ref 4.7–6.1)
SODIUM SERPL-SCNC: 135 MMOL/L (ref 135–145)
WBC # BLD AUTO: 4.9 K/UL (ref 4.8–10.8)

## 2020-08-23 PROCEDURE — 80069 RENAL FUNCTION PANEL: CPT

## 2020-08-23 PROCEDURE — 83735 ASSAY OF MAGNESIUM: CPT

## 2020-08-23 PROCEDURE — 99232 SBSQ HOSP IP/OBS MODERATE 35: CPT | Performed by: HOSPITALIST

## 2020-08-23 PROCEDURE — 700111 HCHG RX REV CODE 636 W/ 250 OVERRIDE (IP): Performed by: INTERNAL MEDICINE

## 2020-08-23 PROCEDURE — 700102 HCHG RX REV CODE 250 W/ 637 OVERRIDE(OP): Performed by: INTERNAL MEDICINE

## 2020-08-23 PROCEDURE — 36415 COLL VENOUS BLD VENIPUNCTURE: CPT

## 2020-08-23 PROCEDURE — 770001 HCHG ROOM/CARE - MED/SURG/GYN PRIV*

## 2020-08-23 PROCEDURE — A9270 NON-COVERED ITEM OR SERVICE: HCPCS | Performed by: INTERNAL MEDICINE

## 2020-08-23 PROCEDURE — 85025 COMPLETE CBC W/AUTO DIFF WBC: CPT

## 2020-08-23 RX ADMIN — ASPIRIN 81 MG: 81 TABLET, COATED ORAL at 05:17

## 2020-08-23 RX ADMIN — DOCUSATE SODIUM 50 MG AND SENNOSIDES 8.6 MG 2 TABLET: 8.6; 5 TABLET, FILM COATED ORAL at 05:17

## 2020-08-23 RX ADMIN — HEPARIN SODIUM 5000 UNITS: 5000 INJECTION, SOLUTION INTRAVENOUS; SUBCUTANEOUS at 21:35

## 2020-08-23 RX ADMIN — HEPARIN SODIUM 5000 UNITS: 5000 INJECTION, SOLUTION INTRAVENOUS; SUBCUTANEOUS at 14:43

## 2020-08-23 RX ADMIN — Medication 667 MG: at 05:17

## 2020-08-23 RX ADMIN — HEPARIN SODIUM 5000 UNITS: 5000 INJECTION, SOLUTION INTRAVENOUS; SUBCUTANEOUS at 05:17

## 2020-08-23 RX ADMIN — Medication 667 MG: at 16:55

## 2020-08-23 RX ADMIN — Medication 667 MG: at 11:25

## 2020-08-23 ASSESSMENT — ENCOUNTER SYMPTOMS
DIARRHEA: 1
NECK PAIN: 0
CONSTIPATION: 0
WEAKNESS: 1
HEMOPTYSIS: 0
NAUSEA: 0
DEPRESSION: 0
FEVER: 0
COUGH: 0
MYALGIAS: 1
DIZZINESS: 0
SHORTNESS OF BREATH: 0
PHOTOPHOBIA: 0
PALPITATIONS: 0
DIAPHORESIS: 1
HEARTBURN: 0
ABDOMINAL PAIN: 0
SORE THROAT: 0
BRUISES/BLEEDS EASILY: 0
VOMITING: 0
BLURRED VISION: 0

## 2020-08-23 NOTE — PROGRESS NOTES
[facility] Nephrology Daily Progress Note     Date of Service       Author: Kieran Perez Jr., MD     Chief Complaint  76 yo male with h/o HTN, who presented to [facility] with ARMANDO. He was found to be COVID positive. He reportedly was on lisinopril, but patient states he has not taken that for years. He denies NSAID use. He does report some diarrhea. He denies change in urination. He reports no pain. He was found to have creatinine of 12, BUN of 112 and K of 6.0 so was transferred down to [facility].   Since admission, he reports making some urine. He was started on sodium bicarbonate gtt. He asks why he is getting his blood checked so often.   I d/w him possible need for dialysis. He seems sceptical that he needs it, but I d/w him if K keeps rising, his heart can stop.   Renal US shows atrophic right kidney as well as right renal mass. No obstruction of left kidney.      Daily Nephrology Summary:   8/19 - K better. Creatine falling slowly. He is making urine.  used. Patient upset about not being sent home. He also is yelling at times without face mask on. I told him eventually I had to leave given risk of COVID transmission and I had said everything I could say about his kidney function.   8/20 - Patient reportedly the same. I did not see patient personally but talked with hospitalist and RN outside room. No symptoms of renal failure. Making ample UOP.   8/21 - Seen from doorway. NS overnight. Creatinine still high, but BUN still decreasing. Making ample UOP.   8/23 - D/w RN. No changes. No edema, no SOB. Appetite good. No symptoms.        Physical Exam  Vitals:    08/23/20 0720   BP: 147/56   Pulse: 94   Resp: 18   Temp: 37 °C (98.6 °F)   SpO2: 92%          Physical Exam  Per RN  No edema  No shortness of breath  Aler, oriented           Fluids       Intake/Output Summary (Last 24 hours) at 8/23/2020 1106  Last data filed at 8/22/2020 1200  Gross per 24 hour   Intake --   Output 120 ml    Net -120 ml           Labs    Lab Results   Component Value Date/Time    SODIUM 135 08/23/2020 02:01 AM    POTASSIUM 3.6 08/23/2020 02:01 AM    CHLORIDE 94 (L) 08/23/2020 02:01 AM    CO2 22 08/23/2020 02:01 AM    GLUCOSE 91 08/23/2020 02:01 AM    BUN 66 (H) 08/23/2020 02:01 AM    CREATININE 7.80 (HH) 08/23/2020 02:01 AM       Recent Labs     08/23/20  0201   WBC 4.9   RBC 3.77*   HEMOGLOBIN 11.0*   HEMATOCRIT 33.8*   MCV 89.7   MCH 29.2   RDW 40.5   PLATELETCT 240   MPV 10.5   NEUTSPOLYS 71.70   LYMPHOCYTES 16.60*   MONOCYTES 9.50   EOSINOPHILS 1.40   BASOPHILS 0.60       Assessment/Plan  76 yo male with h/o HTN, CKD stage 3 with creatinine 1.8 on 7/29, who presents with creatinine of 12 in setting of COVID positive     1) ARMANDO - suspect mixture of pre-renal/ATN given diarrhea and COVID positive. FeNa 7.9%, c/w ATN.   2) COVID Positive  3) Hyperkalemia - resolved  4) Possible CKD stage 3 - creaitnine 1.8 on 7/29, 2.5 weeks prior to admission  5) Diarrhea   6) Azotemia - without signs of uremia.   7) Renal mass - right atrophic kidney. Will need evaluation at later date.      PLAN:    1. I am on the fence about discharging him soon. No need for dialysis and I think he will not need it. My concern is just with adherence to diet and no NSAIDs as outpatient. He should recover from the ATN.   2. Recommend 1 more day, so that smooth transition for follow-up care can be made for outpatient, so that he can have appointment with [facility] before discharge.   3. However, if he demands to go today, would recommend sodium bicarbonate 1300mg BID on discharge and renal diet pamphlet.     Kieran Perez Jr, MD  [facility] Nephrology

## 2020-08-23 NOTE — PROGRESS NOTES
[facility] Medicine Daily Progress Note    Date of Service  8/23/2020    Chief Complaint  75 y.o. male admitted 8/18/2020 with No chief complaint on file.        Hospital Course    This 75-year-old male with a past medical history significant for hypertension on lisinopril was transferred from [facility] after he was noted to be in acute renal failure with a creatinine of 12.7.  Patient was recently diagnosed with COVID on July 29; at that time he is noted to have BUN of 43 and creatinine of 1.8.  Patient reports he was having loose bowel movement, decreased appetite for some time.     Upon presentation he is found to have BUN of 111, creatinine of 12.7, potassium 6.0.  Urine sodium, urine creatinine ordered, retroperitoneal ultrasound obtained; no hydronephrosis noted, atrophic right kidney possible due to chronic renal disease, 1.6 cm right renal cortical mass.  I discussed the case with Dr. Heredia who will follow the patient as an outpatient Dr. Heredia stated that he will make an appointment for him.    Nephrology following, nephrology stated that it could likely be ATN arsen in the setting of covid, will take some time to recover. His hyperkalemia has improved.  Per infection control, he is noted to be  Recovered from infection.    Interval Problem Update  No acute events overnight, laying in the bed, potassium at 4.2, creatinine 10.72, nephro following  --Continue IV fluid, monitor I/os, avoid nephrotoxic medication.  --- Discussed the plan of the care with the patient by using     8/20:  --- No acute events overnight, denied any complaint.  Explained in detail the need to stay in the hospital.  Continue IV fluid, monitor I/O, nephrology following, follow nephrology recommendation  --- Potassium normal  --- Patient stated that he has been having loose bowel movement; less likely infectious etiology; if continues to get worse' will obtain stool ova and parasites     8/21:   --- No acute events  overnight, laying in bed,  Monitor I/Os, daily weight     --- avoid nephrotoxin , nephrology recs     8/22: Patient has been considered recovered from covid  Infection as per CDC guidelines, no acute events overnight, laying in bed, continue half-normal saline with 75 mEq of bicarb, repeat BMP pending, nephrology following.   -discussed the plan of care by using     Consultants/Specialty  Nephrology    Code Status  Full Code    Disposition  Home once medically cleared    Review of Systems  Review of Systems   Constitutional: Positive for diaphoresis. Negative for fever.   HENT: Negative for congestion and sore throat.    Eyes: Negative for blurred vision and photophobia.   Respiratory: Negative for cough, hemoptysis and shortness of breath.    Cardiovascular: Negative for chest pain and palpitations.   Gastrointestinal: Positive for diarrhea. Negative for abdominal pain, constipation, heartburn, nausea and vomiting.   Genitourinary: Negative for dysuria and frequency.   Musculoskeletal: Positive for myalgias. Negative for neck pain.   Neurological: Positive for weakness. Negative for dizziness.   Endo/Heme/Allergies: Does not bruise/bleed easily.   Psychiatric/Behavioral: Negative for depression and suicidal ideas.        Physical Exam  Temp:  [36.2 °C (97.2 °F)-37 °C (98.6 °F)] 37 °C (98.6 °F)  Pulse:  [61-94] 94  Resp:  [17-18] 18  BP: (124-147)/(49-56) 147/56  SpO2:  [90 %-94 %] 92 %    Physical Exam  Vitals signs and nursing note reviewed.   Constitutional:       Appearance: Normal appearance.   HENT:      Head: Normocephalic and atraumatic.   Eyes:      Extraocular Movements: Extraocular movements intact.   Neck:      Musculoskeletal: Neck supple. No neck rigidity.   Cardiovascular:      Rate and Rhythm: Normal rate and regular rhythm.      Pulses: Normal pulses.      Heart sounds: No murmur.   Pulmonary:      Effort: Pulmonary effort is normal.      Breath sounds: Rales present.   Abdominal:       General: Abdomen is flat. Bowel sounds are normal. There is no distension.      Palpations: Abdomen is soft.   Musculoskeletal: Normal range of motion.         General: No swelling.      Right lower leg: No edema.   Skin:     General: Skin is warm.   Neurological:      General: No focal deficit present.      Mental Status: He is alert and oriented to person, place, and time.      Cranial Nerves: No cranial nerve deficit.      Sensory: No sensory deficit.   Psychiatric:         Mood and Affect: Mood normal.         Fluids    Intake/Output Summary (Last 24 hours) at 8/23/2020 1119  Last data filed at 8/22/2020 1200  Gross per 24 hour   Intake --   Output 120 ml   Net -120 ml       Laboratory  Recent Labs     08/23/20  0201   WBC 4.9   RBC 3.77*   HEMOGLOBIN 11.0*   HEMATOCRIT 33.8*   MCV 89.7   MCH 29.2   MCHC 32.5*   RDW 40.5   PLATELETCT 240   MPV 10.5     Recent Labs     08/21/20  0126 08/22/20  1028 08/23/20  0201   SODIUM 138 135 135   POTASSIUM 4.4 3.7 3.6   CHLORIDE 99 94* 94*   CO2 16* 21 22   GLUCOSE 72 81 91   BUN 85* 69* 66*   CREATININE 10.19* 8.28* 7.80*   CALCIUM 7.6* 7.6* 8.0*                   Imaging  US-RENAL   Final Result      1.  There is no hydronephrosis.   2.  There is an atrophic right kidney possibly due to chronic renal disease or chronic vascular disease.   3.  There is a 1.6 cm indeterminate right renal cortical mass. This could be a complex cyst or solid mass.           Assessment/Plan  Vitamin D deficiency  Assessment & Plan  Will supplement as needed     Normocytic anemia  Assessment & Plan   Reviewed iron Panel  And vitamin b12    -- noted to have  Mild vitamin  D  deficiency , will replete as needed    Renal mass  Assessment & Plan  Renal mass noted on images from CT at [facility] July 29; usg renal showed renal mass   -I called urology Dr. Heredia on 8/20/2020 with stated that he will follow the patient as an outpatient.  He also said that he will make an appointment for  him    Hyperkalemia  Assessment & Plan  Resolved , potassium noted at 3.6, monitor    ARF (acute renal failure) (HCC)  Assessment & Plan  Acute on CKD 3, complicated by renal mass   --Likely secondary to ATN, nephrology following, monitor I/Os, avoid nephrotoxin, renal adjustment of medication  Patient to have low bicarb at 16 along with loose bowel movement, he was started on half NS with 75 mEq of bicarb   - creatinine improving at 7s , will monitor    COVID-19 virus infection- (present on admission)  Assessment & Plan  Diagnosed July 29.  Currently asymptomatic.   --- Infection complete, patient has recovered from infection       VTE prophylaxis: heparin

## 2020-08-23 NOTE — CARE PLAN
Problem: Communication  Goal: The ability to communicate needs accurately and effectively will improve  Outcome: PROGRESSING AS EXPECTED     Problem: Safety  Goal: Will remain free from injury  Outcome: PROGRESSING AS EXPECTED  Note: Precautions in place, environment clutter free, bed locked and low, patient educated to call for assistance, call light within reach     Problem: Respiratory:  Goal: Respiratory status will improve  Outcome: PROGRESSING AS EXPECTED  Note: No supplementary oxygen needs

## 2020-08-23 NOTE — CARE PLAN
Problem: Communication  Goal: The ability to communicate needs accurately and effectively will improve  Outcome: PROGRESSING AS EXPECTED     Problem: Safety  Goal: Will remain free from injury  Outcome: PROGRESSING AS EXPECTED  Note: Call light and belongings left within reach, bed locked and low, treaded socks on, patient educated to use call light for assistance  Goal: Will remain free from falls  Outcome: PROGRESSING AS EXPECTED     Problem: Knowledge Deficit  Goal: Knowledge of disease process/condition, treatment plan, diagnostic tests, and medications will improve  Outcome: PROGRESSING AS EXPECTED  Note: Patient updated on plan of care using , patient verbalized understanding, questions answered     Problem: Respiratory:  Goal: Respiratory status will improve  Outcome: PROGRESSING AS EXPECTED

## 2020-08-24 ENCOUNTER — PATIENT OUTREACH (OUTPATIENT)
Dept: HEALTH INFORMATION MANAGEMENT | Facility: OTHER | Age: 75
End: 2020-08-24

## 2020-08-24 VITALS
DIASTOLIC BLOOD PRESSURE: 54 MMHG | SYSTOLIC BLOOD PRESSURE: 128 MMHG | RESPIRATION RATE: 18 BRPM | TEMPERATURE: 97.1 F | HEART RATE: 61 BPM | OXYGEN SATURATION: 92 % | HEIGHT: 61 IN | BODY MASS INDEX: 27.18 KG/M2 | WEIGHT: 143.96 LBS

## 2020-08-24 PROBLEM — E87.5 HYPERKALEMIA: Status: RESOLVED | Noted: 2020-08-18 | Resolved: 2020-08-24

## 2020-08-24 LAB
ANION GAP SERPL CALC-SCNC: 18 MMOL/L (ref 7–16)
BUN SERPL-MCNC: 55 MG/DL (ref 8–22)
CALCIUM SERPL-MCNC: 8.2 MG/DL (ref 8.5–10.5)
CHLORIDE SERPL-SCNC: 95 MMOL/L (ref 96–112)
CO2 SERPL-SCNC: 23 MMOL/L (ref 20–33)
CREAT SERPL-MCNC: 6.91 MG/DL (ref 0.5–1.4)
GLUCOSE SERPL-MCNC: 86 MG/DL (ref 65–99)
POTASSIUM SERPL-SCNC: 3.6 MMOL/L (ref 3.6–5.5)
SODIUM SERPL-SCNC: 136 MMOL/L (ref 135–145)

## 2020-08-24 PROCEDURE — A9270 NON-COVERED ITEM OR SERVICE: HCPCS | Performed by: INTERNAL MEDICINE

## 2020-08-24 PROCEDURE — 36415 COLL VENOUS BLD VENIPUNCTURE: CPT

## 2020-08-24 PROCEDURE — 80048 BASIC METABOLIC PNL TOTAL CA: CPT

## 2020-08-24 PROCEDURE — 99239 HOSP IP/OBS DSCHRG MGMT >30: CPT | Performed by: HOSPITALIST

## 2020-08-24 PROCEDURE — 700102 HCHG RX REV CODE 250 W/ 637 OVERRIDE(OP): Performed by: INTERNAL MEDICINE

## 2020-08-24 PROCEDURE — 700111 HCHG RX REV CODE 636 W/ 250 OVERRIDE (IP): Performed by: INTERNAL MEDICINE

## 2020-08-24 RX ORDER — SODIUM BICARBONATE 650 MG/1
1300 TABLET ORAL 4 TIMES DAILY
Qty: 120 TAB | Refills: 3 | Status: SHIPPED | OUTPATIENT
Start: 2020-08-24 | End: 2021-02-02

## 2020-08-24 RX ORDER — ERGOCALCIFEROL 1.25 MG/1
50000 CAPSULE ORAL
Qty: 11 CAP | Refills: 0 | Status: SHIPPED | OUTPATIENT
Start: 2020-08-28 | End: 2020-10-27

## 2020-08-24 RX ADMIN — Medication 667 MG: at 08:03

## 2020-08-24 RX ADMIN — HEPARIN SODIUM 5000 UNITS: 5000 INJECTION, SOLUTION INTRAVENOUS; SUBCUTANEOUS at 05:05

## 2020-08-24 RX ADMIN — DOCUSATE SODIUM 50 MG AND SENNOSIDES 8.6 MG 2 TABLET: 8.6; 5 TABLET, FILM COATED ORAL at 16:31

## 2020-08-24 RX ADMIN — Medication 667 MG: at 16:31

## 2020-08-24 RX ADMIN — ASPIRIN 81 MG: 81 TABLET, COATED ORAL at 05:05

## 2020-08-24 RX ADMIN — DOCUSATE SODIUM 50 MG AND SENNOSIDES 8.6 MG 2 TABLET: 8.6; 5 TABLET, FILM COATED ORAL at 05:06

## 2020-08-24 RX ADMIN — Medication 667 MG: at 12:15

## 2020-08-24 ASSESSMENT — FIBROSIS 4 INDEX: FIB4 SCORE: 2.2

## 2020-08-24 NOTE — PROGRESS NOTES
Pt stated he wanted paper work going over with daughter, Adelina gordon, called at this time to go over paper work and she verbalized understanding of it all, she stated she would be here around 5 to  her father due to sshe gets off work around 3-330 and its a 1 1/2 hour drive

## 2020-08-24 NOTE — CARE PLAN
Problem: Communication  Goal: The ability to communicate needs accurately and effectively will improve  Outcome: PROGRESSING AS EXPECTED     Problem: Safety  Goal: Will remain free from injury  Outcome: PROGRESSING AS EXPECTED  Goal: Will remain free from falls  Outcome: PROGRESSING AS EXPECTED     Problem: Respiratory:  Goal: Respiratory status will improve  Outcome: PROGRESSING AS EXPECTED

## 2020-08-24 NOTE — PROGRESS NOTES
Home meds missing. Per Pharmacy tech, Husam Bustos logged at 0145 am that meds were ready to be picked up, but meds never made it to the pharmacy.

## 2020-08-24 NOTE — DISCHARGE PLANNING
Anticipated Discharge Disposition: Home    Action: SW completed chart review. Full assessment to come. No O2, unsure of anticipated d/c  No insurance.     Barriers to Discharge: TBD/No insurances.     Plan: SW to follow up w/ patient and in rounds for update.

## 2020-08-24 NOTE — CARE PLAN
Problem: Nutritional:  Goal: Achieve adequate nutritional intake  Description: Patient will consume 50% of meals  Outcome: PROGRESSING SLOWER THAN EXPECTED   Varied intake ranging from <25%-75%.  RD reviewed appropriate snacks and oral nutrition supplement regimen and will continue to monitor for adequate intake.

## 2020-08-24 NOTE — PROGRESS NOTES
[facility] -  PROGRESS NOTE               Author: Patricia Rowe M.D. Date & Time: 8/24/2020  2:43 PM     HPI:  This 75-year-old male with a past medical history significant for hypertension on lisinopril was transferred from [facility] after he was noted to be in acute renal failure with a creatinine of 12.7.  Patient was recently diagnosed with COVID on July 29; at that time he is noted to have BUN of 43 and creatinine of 1.8.  Patient reports he was having loose bowel movement, decreased appetite for some time.  Upon presentation he is found to have BUN of 111, creatinine of 12.7, potassium 6.0.  Urine sodium, urine creatinine ordered, retroperitoneal ultrasound obtained; no hydronephrosis noted, atrophic right kidney possible due to chronic renal disease, 1.6 cm right renal cortical mass.  I discussed the case with Dr. Heredia who will follow the patient as an outpatient Dr. Heredia stated that he will make an appointment for him.  Nephrology following, nephrology stated that it could likely be ATN arsen in the setting of covid, will take some time to recover. His hyperkalemia has improved.  Per infection control, he is noted to be  Recovered from infection.    DAILY NEPHROLOGY SUMMARY:  8/19 - K better. Creatine falling slowly. He is making urine.  used. Patient upset about not being sent home. He also is yelling at times without face mask on. I told him eventually I had to leave given risk of COVID transmission and I had said everything I could say about his kidney function.   8/20 - Patient reportedly the same. I did not see patient personally but talked with hospitalist and RN outside room. No symptoms of renal failure. Making ample UOP.   8/21 - Seen from doorway. NS overnight. Creatinine still high, but BUN still decreasing. Making ample UOP.   8/23 - D/w RN. No changes. No edema, no SOB. Appetite good. No symptoms.   8/24: YON Hospitalist note reviewed from  "yesterday and summarized as follows: IVF with bicarb to help with volume repletion.  No symptoms from COVID anymore.  Urology was consulted for ?renal mass     REVIEW OF SYSTEMS:    Review of Systems   Unable to perform ROS: Acuity of condition     PAST FAMILY HISTORY: Reviewed and unchanged since admission  PAST SURGICAL HISTORY:  Reviewed and unchanged since admission  SOCIAL HISTORY:  Reviewed and unchanged since admission  FAMILY HISTORY: Reviewed and unchanged since admission  CURRENT MEDICATIONS: Reviewed since admission to present  IMAGING STUDIES: Reviewed since admission to present  LABORATORY STUDIES: Reviewed since admission to present    PHYSICAL EXAM:  VS:  /46   Pulse 61   Temp 36 °C (96.8 °F) (Temporal)   Resp 19   Ht 1.549 m (5' 1\")   Wt 65.3 kg (143 lb 15.4 oz)   SpO2 92%   BMI 27.20 kg/m²   Physical Exam  Vitals signs and nursing note reviewed.     Physical exam deferred due to COVID-19 pandemic     Fluids:  In: 840 [P.O.:840]  Out: 300     LABS:  Recent Results (from the past 24 hour(s))   Basic Metabolic Panel    Collection Time: 08/24/20  3:45 AM   Result Value Ref Range    Sodium 136 135 - 145 mmol/L    Potassium 3.6 3.6 - 5.5 mmol/L    Chloride 95 (L) 96 - 112 mmol/L    Co2 23 20 - 33 mmol/L    Glucose 86 65 - 99 mg/dL    Bun 55 (H) 8 - 22 mg/dL    Creatinine 6.91 (HH) 0.50 - 1.40 mg/dL    Calcium 8.2 (L) 8.5 - 10.5 mg/dL    Anion Gap 18.0 (H) 7.0 - 16.0   ESTIMATED GFR    Collection Time: 08/24/20  3:45 AM   Result Value Ref Range    GFR If  9 (A) >60 mL/min/1.73 m 2    GFR If Non  8 (A) >60 mL/min/1.73 m 2       (click the triangle to expand results)    IMPRESSION:  - ARMANDO    * Etiology likely 2/2 ATN from COVID-19    * Non-oliguric    * SCr trending down  - CKD Stage 3    * Etiology likely 2/2 HTN    * BCr ~ 1.8  - COVID-19    * Currently asymptomatic  - Hyperkalemia    * Improved  - HTN    * Goal BP < 140/90    * Stable  - Renal mass    * Needs " further evaluation    PLAN:  - No compelling indication for RRT at this time  - Daily labs to trend SCr  - Dose all meds per eGFR < 15  - Ok to transition to OP care  - Will arrange for FU in our clinic within 2-3 weeks with labs to FU  - Avoid NSAIDs    All prior notes form other doctors and RN staff were reviewed from admission to current day to help me make my clinical decisions    This patient was seen under COVID 19 pandemic disaster response conditions.  During a disaster, the provisions of care is subject to the Crisis Standard of Care.

## 2020-08-24 NOTE — CARE PLAN
Problem: Communication  Goal: The ability to communicate needs accurately and effectively will improve  Outcome: MET     Problem: Safety  Goal: Will remain free from injury  8/24/2020 1513 by Lionel Wan R.N.  Outcome: MET  8/24/2020 0917 by Lionel Wan R.N.  Outcome: PROGRESSING AS EXPECTED  Goal: Will remain free from falls  Outcome: MET     Problem: Infection  Goal: Will remain free from infection  Outcome: MET     Problem: Venous Thromboembolism (VTW)/Deep Vein Thrombosis (DVT) Prevention:  Goal: Patient will participate in Venous Thrombosis (VTE)/Deep Vein Thrombosis (DVT)Prevention Measures  8/24/2020 1513 by Lionel Wan R.N.  Outcome: MET  8/24/2020 0917 by Lionel Wan R.N.  Outcome: PROGRESSING AS EXPECTED     Problem: Bowel/Gastric:  Goal: Normal bowel function is maintained or improved  Outcome: MET  Goal: Will not experience complications related to bowel motility  Outcome: MET     Problem: Knowledge Deficit  Goal: Knowledge of disease process/condition, treatment plan, diagnostic tests, and medications will improve  Outcome: MET  Goal: Knowledge of the prescribed therapeutic regimen will improve  Outcome: MET     Problem: Discharge Barriers/Planning  Goal: Patient's continuum of care needs will be met  Outcome: MET     Problem: Respiratory:  Goal: Respiratory status will improve  Outcome: MET     Problem: Psychosocial Needs:  Goal: Level of anxiety will decrease  Outcome: MET

## 2020-08-24 NOTE — DISCHARGE INSTRUCTIONS
Discharge Instructions    Discharged to home by car with relative. Discharged via wheelchair, hospital escort: Yes.  Special equipment needed: Not Applicable    Be sure to schedule a follow-up appointment with your primary care doctor or any specialists as instructed.     Discharge Plan:        I understand that a diet low in cholesterol, fat, and sodium is recommended for good health. Unless I have been given specific instructions below for another diet, I accept this instruction as my diet prescription.   Other diet: 2 gram Sodium Renal     Special Instructions: None    · Is patient discharged on Warfarin / Coumadin?   No     Depression / Suicide Risk    As you are discharged from this [facility] facility, it is important to learn how to keep safe from harming yourself.    Recognize the warning signs:  · Abrupt changes in personality, positive or negative- including increase in energy   · Giving away possessions  · Change in eating patterns- significant weight changes-  positive or negative  · Change in sleeping patterns- unable to sleep or sleeping all the time   · Unwillingness or inability to communicate  · Depression  · Unusual sadness, discouragement and loneliness  · Talk of wanting to die  · Neglect of personal appearance   · Rebelliousness- reckless behavior  · Withdrawal from people/activities they love  · Confusion- inability to concentrate     If you or a loved one observes any of these behaviors or has concerns about self-harm, here's what you can do:  · Talk about it- your feelings and reasons for harming yourself  · Remove any means that you might use to hurt yourself (examples: pills, rope, extension cords, firearm)  · Get professional help from the community (Mental Health, Substance Abuse, psychological counseling)  · Do not be alone:Call your Safe Contact- someone whom you trust who will be there for you.  · Call your local CRISIS HOTLINE 856-1365 or 738-983-5646  · Call your local Children's  Mobile Crisis Response Team [place] (692) 624-6444 or www.eVoter.WebVisible  · Call the toll free National Suicide Prevention Hotlines   · National Suicide Prevention Lifeline 533-991-TQCZ (2245)  · National Hope Line Network 800-SUICIDE (808-4042)    Enfermedad renal - Adulto   (Kidney Disease, Adult)  Los riñones son dos órganos que se encuentran a cada lado de la columna vertebral entre el centro de la espalda y la parte anterior del abdomen. Los riñones:   · Eliminan los desechos y el exceso de agua de la saba.    · Producen hormonas importantes. Regulan la presión arterial, ayudan a mantener los huesos helena y a crear glóbulos rojos.    · Regulan los líquidos y los productos químicos en la saba y los tejidos.  La enfermedad renal ocurre cuando los riñones están dañados. El daño renal puede ser rápido (sean) o desarrollarse lentamente kely un alexis periodo de tiempo (crónico). Un daño pequeño no puede causar problemas, amelie un daño importante puede impedir que los riñones funcionen de la manera adecuada. La detección temprana y el tratamiento de la enfermedad renal pueden prevenir que el daño renal se convierta en crónico, o empeore. Algunas enfermedades renales son curables, amelie la mayoría no lo son. Muchas personas con enfermedad renal pueden controlar pimentel enfermedad y vivir dave meme normal.   TIPOS DE ENFERMEDAD RENAL   · Lesión renal aguda. La lesión renal aguda ocurre cuando hay un daño súbito en los riñones.  · Enfermedades renales crónicas. La enfermedad renal crónica se produce cuando los riñones sufren un daño kely un alexis período.  · Enfermedad renal en etapa terminal. Enfermedad renal terminal ocurre cuando los riñones están mckeon dañados que diogo de funcionar. En la enfermedad renal en etapa terminal, los riñones no pueden funcionar.  CAUSAS   Toda afección, enfermedad o evento que dañe los riñones, puede causar la enfermedad renal.   Lesión renal aguda.   · Problemas con el flujo  sanguíneo a los riñones. Las causas pueden ser:    · Pérdida de saba.    · Enfermedades cardíacas.    · Quemaduras graves.  · Enfermedad hepática.  · Lesión directa en el riñón. Las causas pueden ser:  · Algunos medicamentos.    · Infección renal.    · Intoxicación o consumo de sustancias tóxicas.    · Dave herida quirúrgica.    · Golpe en la tereza renal.    · Problemas en el flujo de orina. Las causas pueden ser:    · Cáncer.    · Piedras en el riñón.    · Agrandamiento de la próstata.  Enfermedades renales crónicas. Las causas más comunes de enfermedad renal crónica son la diabetes y la presión arterial kiran (hipertensión). Otras causas de enfermedad renal crónica pueden ser:   · Enfermedades que causan inflamación en las unidades de filtración de los riñones.    · Enfermedades que afectan el sistema inmunológico.    · Enfermedades genéticas.    · Medicamentos que dañan los riñones, kyle los antiinflamatorios no esteroides (PATRICIA).    · Envenenamiento o exposición a sustancias tóxicas.    · Dave infección urinaria o renal recurrente.    · Problemas en el flujo de orina. Las causas pueden ser:  · Cáncer.    · Piedras en el riñón.    · La próstata agrandada en los hombres.  Enfermedad renal en etapa terminal. Esta enfermedad renal ocurre cuando la enfermedad renal crónica empeora. También puede aparecer después de dave lesión renal aguda.   SÍNTOMAS   · Hinchazón (edema) de las piernas, tobillos o pies.    · Cansancio (letargo).    · Náuseas o vómitos.    · Confusión.    · Problemas en la micción, tales kyle:    · Sensación de dolor o ardor al orinar.    · Disminución de la producción de orina.  · Saba en la orina.    · Aumento de los deseos de orinar, especialmente por la noche.  · Hipertensión arterial.   · Contracciones o calambres musculares.    · Falta de aire.    · Picazón persistente.    · Pérdida del apetito.  · Gusto metálico en la boca.  · Debilidad.    · Convulsiones.    · Dolor o molestias en el  pecho.  · Problemas para dormir.    · Dolor de tony.    · La piel se oscurece o se aclara de manera anormal.    · Entumecimiento en las nigel o en los pies.    · Aparecen hematomas con facilidad.    · Hipo frecuente.    · Se detiene la menstruación.  En algunos casos no hay síntomas.     DIAGNÓSTICO   La enfermedad renal crónica se puede detectar y diagnosticar mediante estudios que incluyen análisis de saba, de orina, diagnósticos por imágenes, o dave biopsia de riñón.   TRATAMIENTO   Lesión renal aguda. El tratamiento de la lesión renal aguda varía según la causa y la gravedad del daño renal. En los casos leves, puede no ser necesario el tratamiento. Los riñones pueden curarse por sí mismos. Si la insuficiencia renal aguda es más grave, el médico tratará la causa del daño renal, le dará un tratamiento para los riñones y tratará de prevenir las complicaciones. Los casos graves pueden requerir un procedimiento para eliminar los desechos tóxicos del cuerpo (diálisis) o dave cirugía para reparar el daño renal. La cirugía puede incluir:   · Reparación de un riñón dañado.    · Eliminación de dave obstrucción.    La mayoría de las veces, será necesario que pase la noche en el hospital.   Enfermedades renales crónicas. La mayoría de las enfermedades renales crónicas no pueden curarse. El tratamiento consiste en el alivio de los síntomas y en prevenir o retrasar la progresión de la enfermedad. El tratamiento puede incluir:   · Dave dieta especial. Debe evitar beber alcohol y las comidas que:    · Tengan aleena añadida.    · Contengan kiran cantidad de potasio.    · Steve ricos en proteínas.    · Medicamentos. Pueden ser para:    · Disminuir la presión arterial.    · Mejorar la anemia.    · Bajar la hinchazón.    · Proteger los huesos.    Enfermedad renal en etapa terminal. La enfermedad renal en etapa terminal es potencialmente mortal y debe ser tratada inmediatamente. Existen dos tipos de tratamiento para la enfermedad renal  en etapa terminal:   · Diálisis.    · Recibir un nuevo riñón (transplante de riñón).  Ambos tratamientos tienen riesgos y consecuencias graves. Además de la diálisis o el trasplante de riñón, podrá necesitar madhav medicamentos para controlar la hipertensión y el colesterol y disminuir los niveles de fósforo en la saba.   DURACIÓN DE LA ENFERMEDAD   · Lesión renal aguda. La duración de esta enfermedad varía mucho de dave persona a otra. El tiempo que dura depende de la causa del daño renal. La lesión renal aguda puede convertirse en enfermedad renal crónica o enfermedad renal en etapa terminal.  · Enfermedades renales crónicas. Esta enfermedad suele durar toda la meme. La enfermedad renal crónica puede empeorar con el tiempo para convertirse en enfermedad renal en etapa terminal. El tiempo que humberto para la enfermedad renal en etapa terminal para desarrollarse varía de dave persona a otra.  · Enfermedad renal en etapa terminal. Esta enfermedad dura hasta que se realiza un transplante.  PREVENCIÓN   En algunos casos la enfermedad renal puede prevenirse. Si usted sufre diabetes, hipertensión o cualquier otra enfermedad que pueda causar enfermedad renal, hay que tratar de evitarla:   · Dave dieta adecuada.  · Medicamentos.  · Modificaciones en el estilo de meme.  PARA OBTENER MÁS INFORMACIÓN   American Association of Kidney Patients: www.aakp.org   National Kidney Foundation: www.kidney.org   American Kidney Fund: www.akfinc.org   Life Options Rehabilitation Program: www.lifeoptions.org and www.kidneyschool.org   Document Released: 03/26/2009 Document Revised: 12/04/2013  ExitCare® Patient Information ©2014 [employer].  COVID-19  COVID-19  La COVID-19 es dave infección respiratoria causada por un virus llamado coronavirus tipo 2 causante del síndrome respiratorio sean grave (SARS-CoV-2). La enfermedad también se conoce kyle enfermedad por coronavirus o nuevo coronavirus. En algunas personas, el virus puede no ocasionar  síntomas. En otras, puede producir dave infección grave. La infección puede empeorar rápidamente y causar complicaciones, kyle:  · Neumonía o infección en los pulmones.  · Síndrome de dificultad respiratoria aguda o SDRA. Se trata de la acumulación de líquido en los pulmones.  · Insuficiencia respiratoria aguda. Se trata de dave afección en la que no pasa suficiente oxígeno de los pulmones al cuerpo.  · Sepsis o choque séptico. Se trata de dave reacción grave del cuerpo ante dave infección.  · Problemas de coagulación.  · Infecciones secundarias debido a bacterias u hongos.  El virus que causa la COVID-19 es contagioso. [place] significa que puede transmitirse de dave persona a otra a través de las gotitas de saliva de la tos y de los estornudos (secreciones respiratorias).  ¿Cuáles son las causas?  Esta enfermedad es causada por un virus. Usted puede contagiarse con neyda virus:  · Al aspirar las gotitas que dave persona infectada elimina al toser o estornudar.  · Al tocar algo, kyle dave [place] o el picaportes de dave thompson, que estuvo expuesto al virus (contaminado) y luego tocarse la boca, nariz o los ojos.  ¿Qué incrementa el riesgo?  Riesgo de infección  Es más probable que se infecte con neyda virus si:  · Vive o viaja a dave tereza donde hay un brote de COVID-19.  · Entra en contacto con dave persona enferma que recientemente viajó a dave tereza con un brote de COVID-19.  · Cuida o vive con dave persona infectada con COVID-19.  Riesgo de enfermedad grave  Es más probable que se enferme gravemente por el virus si:  · Tiene 65 años o más.  · Tiene dave enfermedad crónica que disminuye la capacidad del cuerpo para combatir las infecciones (immunocomprometido).  · Vive en un hogar de ancianos o centro de atención a alexis plazo.  · Tiene dave enfermedad prolongada (crónica), kyle las siguientes:  ? Enfermedad pulmonar crónica, que incluye la enfermedad pulmonar obstructiva crónica o asma.  ? Enfermedad  cardíaca.  ? Diabetes.  ? Enfermedad renal crónica.  ? Enfermedad hepática.  · Es lindsay.  ¿Cuáles son los signos o síntomas?  Los síntomas de esta afección pueden ser de leves a graves. Los síntomas pueden aparecer en el término de 2 a 14 días después de joey estado expuesto al virus. Incluyen los siguientes:  · Fiebre.  · Tos.  · Dificultad para respirar.  · Escalofríos.  · Katie musculares.  · Dolor de garganta.  · Pérdida del gusto o el olfato.  Algunas personas también pueden tener problemas estomacales, kyle náuseas, vómitos o diarrea.  Es posible que otras personas no tengan síntomas de COVID-19.  ¿Cómo se diagnostica?  Esta afección se puede diagnosticar en función de lo siguiente:  · Yulia signos y síntomas, especialmente si:  ? Vive en dave tereza donde hay un brote de COVID-19.  ? Viajó recientemente a dave tereza donde el virus es frecuente.  ? Cuida o vive con dave persona a quien se le diagnosticó COVID-19.  · Un examen físico.  · Análisis de laboratorio que pueden incluir:  ? Un hisopado nasal para madhav dave muestra de líquido de la nariz.  ? Un hisopado de garganta para madhav dave muestra de líquido de la garganta.  ? Dave muestra de mucosidad de los pulmones (esputo).  ? Análisis de saba.  · Los estudios de diagnóstico por imágenes pueden incluir radiografías, exploración por tomografía computarizada (TC) o ecografía.  ¿Cómo se trata?  En neyda momento, no hay ningún medicamento para tratar la COVID-19. Los medicamentos para tratar otras enfermedades se usan a modo de ensayo para comprobar si son eficaces contra la COVID-19.  El médico le informará sobre las maneras de tratar los síntomas. En la mayoría de las personas, la infección es leve y puede controlarse en el hogar con reposo, líquidos y medicamentos de venta farrah.  El tratamiento para dave infección grave suele realizarse en la unidad de cuidados intensivos (UCI) de un hospital. Puede incluir aldair o más de los siguientes. Estos tratamientos se  administran hasta que los síntomas mejoran.  · Recibir líquidos y medicamentos a través de dave vía intravenosa.  · Oxígeno complementario. Para administrar oxígeno extra, se utiliza un tubo en la nariz, dave mascarilla o dave valentina de oxígeno.  · Colocarlo para que se recueste boca abajo (decúbito prono). Narcissa facilita el ingreso de oxígeno a los pulmones.  · Uso continuo de dave máquina de presión positiva de las vías aéreas (CPAP) o de presión positiva de las vías aéreas de dos niveles (BPAP). Neyda tratamiento utiliza dave presión de aire leve para mantener las vías respiratorias abiertas. Un tubo conectado a un motor administra oxígeno al cuerpo.  · Respirador. Neyda tratamiento mueve el aire dentro y fuera de los pulmones mediante el uso de un tubo que se coloca en la tráquea.  · Traqueostomía. En neyda procedimiento se hace un orificio en el marcelina para insertar un tubo de respiración.  · Oxigenación por membrana extracorpórea (OMEC). En neyda procedimiento, los pulmones tienen la posibilidad de recuperarse al asumir las funciones del corazón y los pulmones. Suministra oxígeno al cuerpo y elimina el dióxido de carbono.  Siga estas instrucciones en pimentel casa:  Estilo de meme  · Si está enfermo, quédese en pimentel casa, excepto para obtener atención médica. El médico le indicará cuánto tiempo debe quedarse en casa. Llame al médico antes de buscar atención médica.  · [place]sca reposo en pimentel casa kyle se lo haya indicado el médico.  · No consuma ningún producto que contenga nicotina o tabaco, kyle cigarrillos, cigarrillos electrónicos y tabaco de mascar. Si necesita ayuda para dejar de fumar, consulte al médico.  · Retome jania actividades normales kyle se lo haya indicado el médico. Pregúntele al médico qué actividades son seguras para usted.  Instrucciones generales  · Use los medicamentos de venta farrah y los recetados solamente kyle se lo haya indicado el médico.  · Idania suficiente líquido kyle para mantener la orina de color  amarillo pálido.  · Concurra a todas las visitas de seguimiento kyle se lo haya indicado el médico. [place] es importante.  ¿Cómo se brittanie?    No hay ninguna vacuna que ayude a prevenir la infección por la COVID-19. Sin embargo, hay medidas que puede madhav para protegerse y proteger a otras personas de neyda virus.  Para protegerse:   · No viaje a zonas donde la COVID-19 sea un riesgo. Las zonas donde se informa la presencia de la COVID-19 cambian con frecuencia. Para identificar las zonas de alto riesgo y las restricciones de viaje, consulte el sitio web de viajes de los Centers for Disease Control and Prevention (CDC) (Centros para el Control y la Prevención de Enfermedades): wwwnc.cdc.gov/travel/notices  · Si vive o debe viajar a dave tereza donde COVID-19 es un riesgo, tome precauciones para evitar infecciones.  ? Aléjese de las personas enfermas.  ? Lávese las nigel frecuentemente con agua y jabón kely 20 segundos. Use desinfectante para nigel con alcohol si no dispone de agua y jabón.  ? Evite tocarse la boca, la elsi, los ojos o la nariz.  ? Evite salir de pimentel casa, siga las indicaciones de pimentel estado y de las autoridades sanitarias locales.  ? Si debe salir de pimentel casa, use un barbijo de disha o dave mascarilla facial.  ? Desinfecte los objetos y las superficies que se tocan con frecuencia todos los días. Pueden incluir:  § Encimeras y mesas.  § Picaportes e interruptores de deon.  § Lavabos, fregaderos y grifos.  § Aparatos electrónicos tales kyle teléfonos, controles remotos, teclados, computadoras y tabletas.  Cómo proteger a los demás:  Si tiene síntomas de la COVID-19, tome medidas para evitar que el virus se propague a otras personas.  · Si douglas que tiene dave infección por la COVID-19, comuníquese de inmediato con pimentel médico. Informe al equipo de atención médica que douglas que puede tener dave infección por la COVID-19.  · Quédese en pimentel casa. Salga de pimentel casa solo para buscar atención médica. No utilice el  transporte público.  · No viaje mientras esté enfermo.  · Lávese las nigel frecuentemente con agua y jabón kely 20 segundos. Usar desinfectante para nigel con alcohol si no dispone de agua y jabón.  · Manténgase alejado de quienes vivan con usted. Permita que los miembros de la adela sanos cuiden a los niños y las mascotas, si es posible. Si tiene que cuidar a los niños o las mascotas, lávese las nigel con frecuencia y use un barbijo. Si es posible, permanezca en pimentel habitación, separado de los demás. Utilice un baño diferente.  · Asegúrese de que todas las personas que viven en pimentel casa se laven yoshi las nigel y con frecuencia.  · Tosa o estornude en un pañuelo de papel o sobre pimentel manga o codo. No tosa o estornude al aire ni se cubra la boca o la nariz con la mano.  · Use un barbijo de disha o dave mascarilla facial.  Dónde buscar más información  · Centers for Disease Control and Prevention (Centros para el Control y la Prevención de Enfermedades): www.cdc.gov/coronavirus/2019-ncov/index.html  · World Health Organization (Organización Mundial de la Carin): www.who.int/health-topics/coronavirus  Comuníquese con un médico si:  · Vive o ha viajado a dave tereza donde la COVID-19 es un riesgo y tiene síntomas de infección.  · Ha tenido contacto con alguien que tiene COVID-19 y usted tiene síntomas de infección.  Solicite ayuda de inmediato si:  · Tiene dificultad para respirar.  · Siente dolor u opresión en el pecho.  · Experimenta confusión.  · Tiene las uñas de los dedos y los labios de color azulado.  · Tiene dificultad para despertarse.  · Los síntomas empeoran.  Estos síntomas pueden representar un problema grave que constituye dave emergencia. No espere a reagan si los síntomas desaparecen. Solicite atención médica de inmediato. Comuníquese con el servicio de emergencias de pimentel localidad (911 en los [place]). No conduzca por jania propios medios hasta el hospital. Informe al personal médico de emergencias si douglas  que tiene COVID-19.  Resumen  · La COVID-19 es dave infección respiratoria causada por un virus. También se conoce kyle enfermedad por coronavirus o nuevo coronavirus. Puede causar infecciones graves, kyle neumonía, síndrome de dificultad respiratoria aguda, insuficiencia respiratoria aguda o sepsis.  · El virus que causa la COVID-19 es contagioso. [place] significa que puede transmitirse de dave persona a otra a través de las gotitas de saliva de la tos y de los estornudos.  · Es más probable que desarrolle dave enfermedad grave si tiene 65 años o más, tiene un sistema inmunitario débil, vive en un hogar de ancianos o tiene enfermedad crónica.  · No hay ningún medicamento para tratar la COVID-19. El médico le informará sobre las maneras de tratar los síntomas.  · [place] medidas para protegerse y proteger a los demás contra las infecciones. Lávese las nigel con frecuencia y desinfecte los objetos y las superficies que se tocan con frecuencia todos los días. Manténgase alejado de las personas que estén enfermas y use un barbijo si está enfermo.  Esta información no tiene kyle fin reemplazar el consejo del médico. Asegúrese de hacerle al médico cualquier pregunta que tenga.  Document Released: 02/15/2020 Document Revised: 05/19/2020 Document Reviewed: 02/15/2020  Elsevier Patient Education © 2020 [employer]vier Inc.        Discharge Instructions per Barby Negron M.D.  DIET: Resume previous diet  Healthy diet. Plenty of vegetables.  ACTIVITY: Avoid strenuous activity or heavy lifting.   DIAGNOSIS:   Patient Active Problem List   Diagnosis   • COVID-19 virus infection   • Acute appendicitis treated with outpatient antibiotics.   • ARF (acute renal failure) (HCC)   • Renal mass   • Normocytic anemia   • Vitamin D deficiency     Return to ER in the event of new or worsening symptoms. Please note importance of compliance and the patient has agreed to proceed with all medical recommendations and follow up plan indicated above. All  medications come with benefits and risks. Risks may include permanent injury or death and these risks can be minimized with close reassessment and monitoring.   Make an appointment with primary care physician at [facility]  Please follow-up with [facility] [place] [facility] as an outpatient as soon as possible  Avoid nephrotoxin including NSAID(ibuprofen and aspirin Advil naproxen); drink plenty of fluid

## 2020-08-24 NOTE — DISCHARGE SUMMARY
Discharge Summary    CHIEF COMPLAINT ON ADMISSION  No chief complaint on file.      Reason for Admission  Acute Renal Failure     Admission Date  8/18/2020    CODE STATUS  Full Code    HPI & HOSPITAL COURSE  This 75-year-old male with a past medical history significant for hypertension on lisinopril was transferred from [facility] after he was noted to be in acute renal failure with a creatinine of 12.7.  Patient was recently diagnosed with COVID on July 29; at that time he is noted to have BUN of 43 and creatinine of 1.8.  Patient reports he was having loose bowel movement, decreased appetite for some time.     Upon presentation he is found to have BUN of 111, creatinine of 12.7, potassium 6.0.Urine sodium, urine creatinine reviewed, retroperitoneal ultrasound showed no hydronephrosis, atrophic right kidney possibly due to chronic renal disease or chronic vascular disease.  Nephrology was consulted, nephrology continue to follow the patient during the stay in the hospital and he stated that its likely be ATN arsen in the setting of covid, will take some time to recover.Patient renal function continues to improve today his creatinine is noted to be 6.91 from 10.  Patient declined is noted to be unremarkable with a potassium of 3.6. at the time of discharge. Nephrology recommended that patient is clear to be discharged with close follow-up with [facility] nephrology.    Also he is noted to have 1.6 cm right renal cortical mass.  I discussed the case with Dr. Heredia who will follow the patient as an outpatient Dr. Heredia stated that he will make an appointment for him.    Per infection control, he is noted to be  Recovered from infection.    Therefore, he is discharged in fair and stable condition to home with close outpatient follow-up.    The patient met 2-midnight criteria for an inpatient stay at the time of discharge.    Discharge Date  8/24/2020    FOLLOW UP ITEMS POST DISCHARGE  DR Julia Mock  [facility]     DISCHARGE DIAGNOSES  Active Problems:    COVID-19 virus infection POA: Yes      Overview: 7/30/2020 [facility]    ARF (acute renal failure) (HCC) POA: Unknown    Renal mass POA: Unknown    Normocytic anemia POA: Unknown    Vitamin D deficiency POA: Unknown  Resolved Problems:    Hyperkalemia POA: Unknown      FOLLOW UP  No future appointments.  No follow-up provider specified.    MEDICATIONS ON DISCHARGE     Medication List      START taking these medications      Instructions   sodium bicarbonate 650 MG Tabs  Commonly known as: SODIUM BICARBONATE   Take 2 Tabs by mouth 4 times a day.  Dose: 1,300 mg     vitamin D (Ergocalciferol) 1.25 MG (86765 UT) Caps capsule  Start taking on: August 28, 2020  Commonly known as: DRISDOL   Take 1 Cap by mouth every 7 days for 60 days.  Dose: 50,000 Units        CONTINUE taking these medications      Instructions   SB Low Dose ASA EC 81 MG EC tablet  Generic drug: aspirin   Take 81 mg by mouth every day.  Dose: 81 mg        STOP taking these medications    lisinopril 40 MG tablet  Commonly known as: PRINIVIL            Allergies  No Known Allergies    DIET  Orders Placed This Encounter   Procedures   • Diet Order Renal, 2 Gram Sodium     Standing Status:   Standing     Number of Occurrences:   1     Order Specific Question:   Diet:     Answer:   Renal [8]     Order Specific Question:   Diet:     Answer:   2 Gram Sodium [7]       ACTIVITY  As tolerated.  Weight bearing as tolerated    CONSULTATIONS  Nephrology    PROCEDURES  None    LABORATORY  Lab Results   Component Value Date    SODIUM 136 08/24/2020    POTASSIUM 3.6 08/24/2020    CHLORIDE 95 (L) 08/24/2020    CO2 23 08/24/2020    GLUCOSE 86 08/24/2020    BUN 55 (H) 08/24/2020    CREATININE 6.91 (HH) 08/24/2020        Lab Results   Component Value Date    WBC 4.9 08/23/2020    HEMOGLOBIN 11.0 (L) 08/23/2020    HEMATOCRIT 33.8 (L) 08/23/2020    PLATELETCT 240 08/23/2020        Total  time of the discharge process exceeds 35minutes.  I went to the bedside, evaluate the patient, reviewed extensive data, consultant note.  At this time patient is alert and oriented, totally [place]-speaking.  He is medically stable to be discharged home with a follow-up appointment with Dr. Dumont as an outpatient

## 2020-08-25 NOTE — PROGRESS NOTES
PT discharged home at this time in stable condition, medications were missing and they are aware that will call if found, IV removed before discharged, discharged to daughter in care by wheelchair

## 2020-10-03 PROBLEM — U07.1 COVID-19 VIRUS INFECTION: Status: RESOLVED | Noted: 2020-07-31 | Resolved: 2020-10-03

## 2020-10-03 PROBLEM — K35.30 ACUTE APPENDICITIS WITH LOCALIZED PERITONITIS, WITHOUT PERFORATION OR GANGRENE: Status: RESOLVED | Noted: 2020-08-05 | Resolved: 2020-10-03

## 2020-12-12 PROBLEM — N17.9 ARF (ACUTE RENAL FAILURE) (HCC): Status: RESOLVED | Noted: 2020-08-18 | Resolved: 2020-12-12
